# Patient Record
Sex: FEMALE | Race: WHITE | NOT HISPANIC OR LATINO | Employment: OTHER | URBAN - METROPOLITAN AREA
[De-identification: names, ages, dates, MRNs, and addresses within clinical notes are randomized per-mention and may not be internally consistent; named-entity substitution may affect disease eponyms.]

---

## 2017-01-24 ENCOUNTER — NURSE TRIAGE (OUTPATIENT)
Dept: ADMINISTRATIVE | Facility: CLINIC | Age: 57
End: 2017-01-24

## 2017-01-24 ENCOUNTER — HOSPITAL ENCOUNTER (EMERGENCY)
Facility: HOSPITAL | Age: 57
Discharge: HOME | End: 2017-01-24
Attending: EMERGENCY MEDICINE
Payer: COMMERCIAL

## 2017-01-24 VITALS
OXYGEN SATURATION: 98 % | RESPIRATION RATE: 18 BRPM | HEART RATE: 79 BPM | DIASTOLIC BLOOD PRESSURE: 61 MMHG | SYSTOLIC BLOOD PRESSURE: 116 MMHG | HEIGHT: 64 IN | TEMPERATURE: 99 F | WEIGHT: 203 LBS | BODY MASS INDEX: 34.66 KG/M2

## 2017-01-24 DIAGNOSIS — J02.9 SORE THROAT: ICD-10-CM

## 2017-01-24 DIAGNOSIS — J03.90 ACUTE TONSILLITIS, UNSPECIFIED ETIOLOGY: Primary | ICD-10-CM

## 2017-01-24 LAB
ALBUMIN SERPL BCP-MCNC: 4.1 G/DL
ALP SERPL-CCNC: 91 U/L
ALT SERPL W/O P-5'-P-CCNC: 19 U/L
ANION GAP SERPL CALC-SCNC: 11 MMOL/L
AST SERPL-CCNC: 17 U/L
BASOPHILS # BLD AUTO: 0.02 K/UL
BASOPHILS NFR BLD: 0.2 %
BILIRUB SERPL-MCNC: 1.3 MG/DL
BUN SERPL-MCNC: 8 MG/DL
CALCIUM SERPL-MCNC: 9.5 MG/DL
CHLORIDE SERPL-SCNC: 102 MMOL/L
CO2 SERPL-SCNC: 26 MMOL/L
CREAT SERPL-MCNC: 0.9 MG/DL
DIFFERENTIAL METHOD: ABNORMAL
EOSINOPHIL # BLD AUTO: 0.1 K/UL
EOSINOPHIL NFR BLD: 0.4 %
ERYTHROCYTE [DISTWIDTH] IN BLOOD BY AUTOMATED COUNT: 13.4 %
EST. GFR  (AFRICAN AMERICAN): >60 ML/MIN/1.73 M^2
EST. GFR  (NON AFRICAN AMERICAN): >60 ML/MIN/1.73 M^2
GLUCOSE SERPL-MCNC: 127 MG/DL
HCT VFR BLD AUTO: 41.3 %
HGB BLD-MCNC: 14 G/DL
INR PPP: 1
LYMPHOCYTES # BLD AUTO: 1.6 K/UL
LYMPHOCYTES NFR BLD: 14.2 %
MCH RBC QN AUTO: 29.7 PG
MCHC RBC AUTO-ENTMCNC: 33.9 %
MCV RBC AUTO: 88 FL
MONOCYTES # BLD AUTO: 0.9 K/UL
MONOCYTES NFR BLD: 7.8 %
NEUTROPHILS # BLD AUTO: 8.8 K/UL
NEUTROPHILS NFR BLD: 77.4 %
PLATELET # BLD AUTO: 250 K/UL
PMV BLD AUTO: 9.6 FL
POTASSIUM SERPL-SCNC: 4.2 MMOL/L
PROT SERPL-MCNC: 8.2 G/DL
PROTHROMBIN TIME: 10.7 SEC
RBC # BLD AUTO: 4.72 M/UL
SODIUM SERPL-SCNC: 139 MMOL/L
WBC # BLD AUTO: 11.3 K/UL

## 2017-01-24 PROCEDURE — 25000003 PHARM REV CODE 250: Performed by: EMERGENCY MEDICINE

## 2017-01-24 PROCEDURE — 96375 TX/PRO/DX INJ NEW DRUG ADDON: CPT

## 2017-01-24 PROCEDURE — 63600175 PHARM REV CODE 636 W HCPCS: Performed by: EMERGENCY MEDICINE

## 2017-01-24 PROCEDURE — 99284 EMERGENCY DEPT VISIT MOD MDM: CPT | Mod: 25

## 2017-01-24 PROCEDURE — 25500020 PHARM REV CODE 255: Performed by: EMERGENCY MEDICINE

## 2017-01-24 PROCEDURE — 85025 COMPLETE CBC W/AUTO DIFF WBC: CPT

## 2017-01-24 PROCEDURE — 80053 COMPREHEN METABOLIC PANEL: CPT

## 2017-01-24 PROCEDURE — 85610 PROTHROMBIN TIME: CPT

## 2017-01-24 PROCEDURE — 96365 THER/PROPH/DIAG IV INF INIT: CPT

## 2017-01-24 RX ORDER — MORPHINE SULFATE 4 MG/ML
4 INJECTION, SOLUTION INTRAMUSCULAR; INTRAVENOUS
Status: COMPLETED | OUTPATIENT
Start: 2017-01-24 | End: 2017-01-24

## 2017-01-24 RX ORDER — CLINDAMYCIN HYDROCHLORIDE 150 MG/1
300 CAPSULE ORAL 4 TIMES DAILY
Qty: 56 CAPSULE | Refills: 0 | Status: SHIPPED | OUTPATIENT
Start: 2017-01-24 | End: 2017-01-31

## 2017-01-24 RX ORDER — ONDANSETRON 2 MG/ML
4 INJECTION INTRAMUSCULAR; INTRAVENOUS
Status: COMPLETED | OUTPATIENT
Start: 2017-01-24 | End: 2017-01-24

## 2017-01-24 RX ORDER — NAPROXEN 500 MG/1
500 TABLET ORAL 2 TIMES DAILY WITH MEALS
Qty: 30 TABLET | Refills: 0 | Status: SHIPPED | OUTPATIENT
Start: 2017-01-24 | End: 2019-06-04

## 2017-01-24 RX ORDER — TRAMADOL HYDROCHLORIDE 50 MG/1
50 TABLET ORAL EVERY 6 HOURS PRN
Qty: 11 TABLET | Refills: 0 | Status: SHIPPED | OUTPATIENT
Start: 2017-01-24 | End: 2017-02-03

## 2017-01-24 RX ORDER — CEFDINIR 300 MG/1
300 CAPSULE ORAL 2 TIMES DAILY
COMMUNITY
End: 2019-06-04

## 2017-01-24 RX ORDER — CLINDAMYCIN PHOSPHATE 900 MG/50ML
900 INJECTION, SOLUTION INTRAVENOUS
Status: COMPLETED | OUTPATIENT
Start: 2017-01-24 | End: 2017-01-24

## 2017-01-24 RX ORDER — DEXAMETHASONE SODIUM PHOSPHATE 4 MG/ML
8 INJECTION, SOLUTION INTRA-ARTICULAR; INTRALESIONAL; INTRAMUSCULAR; INTRAVENOUS; SOFT TISSUE
Status: COMPLETED | OUTPATIENT
Start: 2017-01-24 | End: 2017-01-24

## 2017-01-24 RX ADMIN — DEXAMETHASONE SODIUM PHOSPHATE 8 MG: 4 INJECTION, SOLUTION INTRAMUSCULAR; INTRAVENOUS at 09:01

## 2017-01-24 RX ADMIN — CLINDAMYCIN IN 5 PERCENT DEXTROSE 900 MG: 18 INJECTION, SOLUTION INTRAVENOUS at 09:01

## 2017-01-24 RX ADMIN — ONDANSETRON 4 MG: 2 INJECTION INTRAMUSCULAR; INTRAVENOUS at 09:01

## 2017-01-24 RX ADMIN — MORPHINE SULFATE 2 MG: 4 INJECTION, SOLUTION INTRAMUSCULAR; INTRAVENOUS at 09:01

## 2017-01-24 RX ADMIN — IOHEXOL 75 ML: 350 INJECTION, SOLUTION INTRAVENOUS at 11:01

## 2017-01-24 RX ADMIN — SODIUM CHLORIDE 500 ML: 0.9 INJECTION, SOLUTION INTRAVENOUS at 09:01

## 2017-01-24 NOTE — ED TRIAGE NOTES
Pt c/o R-sided throat pain x5 days, painful to palpation.  Pt reports low grade fever at home (Tmax 99.0).

## 2017-01-24 NOTE — DISCHARGE INSTRUCTIONS
When You Have a Sore Throat  A sore throat can be painful. There are many reasons why you may have a sore throat. Your healthcare provider will work with you to find the cause of your sore throat. He or she will also find the best treatment for you.      What Causes a Sore Throat?  Sore throats can be caused or worsened by:  · Cold or flu viruses  · Bacteria  · Irritants such as tobacco smoke  · Acid reflux  A Healthy Throat  The tonsils are on the sides of the throat near the base of the tongue. They collect viruses and bacteria and help fight infection. The throat (pharynx) is the passage for air. Mucus from the nasal cavity also moves down the passage.  An Inflamed Throat  The tonsils and pharynx can become inflamed due to a cold or flu virus. Postnasal drip (excess mucus draining from the nasal cavity) can irritate the throat. It can also make the throat or tonsils more likely to be infected by bacteria. Severe, untreated tonsillitis in children or adults can cause a pocket of pus (abscess) to form near the tonsil.  Your Evaluation  A medical evaluation can help find the cause of your sore throat. It can also help your healthcare provider choose the best treatment for you. The evaluation may include a health history, physical exam, and diagnostic tests.  Health History  Your healthcare provider may ask you the following:  · How long has the sore throat lasted and how have you been treating it?  · Do you have any other symptoms, such as body aches, fever, or cough?  · Does your sore throat recur? If so, how often? How many days of school or work have you missed because of a sore throat?  · Do you have trouble eating or swallowing?  · Have you been told that you snore or have other sleep problems?  · Do you have bad breath?  · Do you cough up bad-tasting mucus?  Physical Exam  During the exam, your healthcare provider checks your ears, nose, and throat for problems. He or she also checks for swelling in the  neck, and may listen to your chest.  Possible Tests  Other tests your healthcare provider may perform include:  · A throat swab to check for bacteria such as streptococcus (the bacteria that causes strep throat)  · A blood test to check for mononucleosis (a viral infection)  · A chest x-ray to rule out pneumonia, especially if you have a cough  Treating a Sore Throat  Treatment depends on many factors. What is the likely cause? Is the problem recent? Does it keep coming back? In many cases, the best thing to do is to treat the symptoms, rest, and let the problem heal itself. Antibiotics may help clear up some infections. For cases of severe or recurring tonsillitis, the tonsils may need to be removed.  Relieving Your Symptoms  · Dont smoke, and avoid secondhand smoke.  · For children, try throat sprays or Popsicles. Adults and older children may try lozenges.  · Drink warm liquids to soothe the throat and help thin mucus. Avoid alcohol, spicy foods, and acidic drinks such as orange juice. These can irritate the throat.  · Gargle with warm saltwater (1 teaspoon of salt to 8 ounces of warm water).  · Use a humidifier to keep air moist and relieve throat dryness.  · Try over-the-counter pain relievers such as acetaminophen or ibuprofen. Use as directed, and dont exceed the recommended dose. Dont give aspirin to children.   Are Antibiotics Needed?  If your sore throat is due to a bacterial infection, antibiotics may speed healing and prevent complications. But most sore throats are caused by cold or flu viruses. And antibiotics dont treat viral illness. In fact, using antibiotics when theyre not needed may produce bacteria that are harder to kill. Your healthcare provider will prescribe antibiotics only if he or she thinks they are likely to help.  If Antibiotics Are Prescribed  Take the medication exactly as directed. Be sure to finish your prescription even if youre feeling better.  And be sure to ask your  healthcare provider or pharmacist what side effects are common and what to do about them.  Is Surgery Needed?  In some cases, tonsils need to be removed. This is often done as outpatient (same-day) surgery. Your healthcare provider may advise removing the tonsils in cases of:  · Several severe bouts of tonsillitis in a year. Severe episodes include those that lead to missed days of school or work, or that need to be treated with antibiotics.  · Tonsillitis that causes breathing problems during sleep.  · Tonsillitis caused by food particles collecting in pouches in the tonsils (cryptic tonsillitis).  Call your healthcare provider if any of the following occur:  · Symptoms worsen, or new symptoms develop.  · Swollen tonsils make breathing difficult.  · The pain is severe enough to keep you from drinking liquids.  · A skin rash, hives, or wheezing develops. Any of these could signal an allergic reaction to antibiotics.  · Symptoms dont improve within a week.  · Symptoms dont improve within 2-3 days of starting antibiotics.   © 4856-4935 [employer] [address] [place] [address], [place] [address]. All rights reserved. This information is not intended as a substitute for professional medical care. Always follow your healthcare professional's instructions.

## 2017-01-24 NOTE — ED AVS SNAPSHOT
[facility]  [id number] [place] [id number]               Lenora Santiago   2017  8:04 AM   ED    Description:  Female : 1960   Department:  [facility]            Your Care was Coordinated By:     Provider Role From To    Andrew Guzmán MD Attending Provider 17 0754 --      Reason for Visit     Sore Throat           Diagnoses this Visit        Comments    Acute tonsillitis, unspecified etiology    -  Primary     Sore throat           ED Disposition     None           To Do List           Follow-up Information     Follow up with Your PCP In 3 days.        Follow up with [facility].    Specialty:  Emergency Medicine    Why:  If symptoms worsen, Or worsening condition or any other major concern    Contact information:    [address] [place] [id number] [phone]       These Medications        Disp Refills Start End    clindamycin (CLEOCIN) 150 MG capsule 56 capsule 0 2017    Take 2 capsules (300 mg total) by mouth 4 (four) times daily. - Oral    naproxen (NAPROSYN) 500 MG tablet 30 tablet 0 2017     Take 1 tablet (500 mg total) by mouth 2 (two) times daily with meals. - Oral    tramadol (ULTRAM) 50 mg tablet 11 tablet 0 2017 2/3/2017    Take 1 tablet (50 mg total) by mouth every 6 (six) hours as needed. - Oral      [facility]     [facility] On Call Nurse Care Line -  Assistance  Registered nurses in the [facility] provide clinical advisement, health education, appointment booking, and other advisory services.  Call for this free service at [phone].             Medications           Message regarding Medications     Verify the changes and/or additions to your medication regime listed below are the same as discussed with your clinician today.  If any of these changes or additions are incorrect, please notify your healthcare  provider.        START taking these NEW medications        Refills    clindamycin (CLEOCIN) 150 MG capsule 0    Sig: Take 2 capsules (300 mg total) by mouth 4 (four) times daily.    Class: Print    Route: Oral    naproxen (NAPROSYN) 500 MG tablet 0    Sig: Take 1 tablet (500 mg total) by mouth 2 (two) times daily with meals.    Class: Print    Route: Oral    tramadol (ULTRAM) 50 mg tablet 0    Sig: Take 1 tablet (50 mg total) by mouth every 6 (six) hours as needed.    Class: Print    Route: Oral      These medications were administered today        Dose Freq    sodium chloride 0.9% bolus 500 mL 500 mL ED 1 Time    Sig: Inject 500 mLs into the vein ED 1 Time.    Class: Normal    Route: Intravenous    clindamycin 900 MG/50 ML D5W 900 mg/50 mL IVPB 900 mg 900 mg ED 1 Time    Sig: Inject 50 mLs (900 mg total) into the vein ED 1 Time.    Class: Normal    Route: Intravenous    dexamethasone injection 8 mg 8 mg ED 1 Time    Sig: Inject 2 mLs (8 mg total) into the vein ED 1 Time.    Class: Normal    Route: Intravenous    morphine injection 4 mg 4 mg ED 1 Time    Sig: Inject 1 mL (4 mg total) into the vein ED 1 Time.    Class: Normal    Route: Intravenous    ondansetron injection 4 mg 4 mg ED 1 Time    Sig: Inject 4 mg into the vein ED 1 Time.    Class: Normal    Route: Intravenous    omnipaque 350 iohexol 75 mL 75 mL IMG once as needed    Sig: Inject 75 mLs into the vein ONCE PRN for contrast.    Class: Normal    Route: Intravenous           Verify that the below list of medications is an accurate representation of the medications you are currently taking.  If none reported, the list may be blank. If incorrect, please contact your healthcare provider. Carry this list with you in case of emergency.           Current Medications     cefdinir (OMNICEF) 300 MG capsule Take 300 mg by mouth 2 (two) times daily.    clindamycin (CLEOCIN) 150 MG capsule Take 2 capsules (300 mg total) by mouth 4 (four) times daily.    naproxen  "(NAPROSYN) 500 MG tablet Take 1 tablet (500 mg total) by mouth 2 (two) times daily with meals.    tramadol (ULTRAM) 50 mg tablet Take 1 tablet (50 mg total) by mouth every 6 (six) hours as needed.           Clinical Reference Information           Your Vitals Were     BP Pulse Temp Resp Height Weight    103/59 (BP Location: Left arm, Patient Position: Lying, BP Method: Automatic) 74 98.7 °F (37.1 °C) (Oral) 18 5' 4" (1.626 m) 92.1 kg (203 lb)    SpO2 BMI             95% 34.84 kg/m2         Allergies as of 1/24/2017     No Known Allergies      Immunizations Administered on Date of Encounter - 1/24/2017     None      ED Micro, Lab, POCT     Start Ordered       Status Ordering Provider    01/24/17 0831 01/24/17 0835  CBC auto differential  STAT      Final result     01/24/17 0831 01/24/17 0835  Comprehensive metabolic panel  STAT      Final result     01/24/17 0831 01/24/17 0835  Protime-INR  STAT      Final result       ED Imaging Orders     Start Ordered       Status Ordering Provider    01/24/17 0835 01/24/17 0835  CT Soft Tissue Neck With Contrast  1 time imaging      Final result         Discharge Instructions         When You Have a Sore Throat  A sore throat can be painful. There are many reasons why you may have a sore throat. Your healthcare provider will work with you to find the cause of your sore throat. He or she will also find the best treatment for you.      What Causes a Sore Throat?  Sore throats can be caused or worsened by:  · Cold or flu viruses  · Bacteria  · Irritants such as tobacco smoke  · Acid reflux  A Healthy Throat  The tonsils are on the sides of the throat near the base of the tongue. They collect viruses and bacteria and help fight infection. The throat (pharynx) is the passage for air. Mucus from the nasal cavity also moves down the passage.  An Inflamed Throat  The tonsils and pharynx can become inflamed due to a cold or flu virus. Postnasal drip (excess mucus draining from the nasal " cavity) can irritate the throat. It can also make the throat or tonsils more likely to be infected by bacteria. Severe, untreated tonsillitis in children or adults can cause a pocket of pus (abscess) to form near the tonsil.  Your Evaluation  A medical evaluation can help find the cause of your sore throat. It can also help your healthcare provider choose the best treatment for you. The evaluation may include a health history, physical exam, and diagnostic tests.  Health History  Your healthcare provider may ask you the following:  · How long has the sore throat lasted and how have you been treating it?  · Do you have any other symptoms, such as body aches, fever, or cough?  · Does your sore throat recur? If so, how often? How many days of school or work have you missed because of a sore throat?  · Do you have trouble eating or swallowing?  · Have you been told that you snore or have other sleep problems?  · Do you have bad breath?  · Do you cough up bad-tasting mucus?  Physical Exam  During the exam, your healthcare provider checks your ears, nose, and throat for problems. He or she also checks for swelling in the neck, and may listen to your chest.  Possible Tests  Other tests your healthcare provider may perform include:  · A throat swab to check for bacteria such as streptococcus (the bacteria that causes strep throat)  · A blood test to check for mononucleosis (a viral infection)  · A chest x-ray to rule out pneumonia, especially if you have a cough  Treating a Sore Throat  Treatment depends on many factors. What is the likely cause? Is the problem recent? Does it keep coming back? In many cases, the best thing to do is to treat the symptoms, rest, and let the problem heal itself. Antibiotics may help clear up some infections. For cases of severe or recurring tonsillitis, the tonsils may need to be removed.  Relieving Your Symptoms  · Dont smoke, and avoid secondhand smoke.  · For children, try throat sprays  or Popsicles. Adults and older children may try lozenges.  · Drink warm liquids to soothe the throat and help thin mucus. Avoid alcohol, spicy foods, and acidic drinks such as orange juice. These can irritate the throat.  · Gargle with warm saltwater (1 teaspoon of salt to 8 ounces of warm water).  · Use a humidifier to keep air moist and relieve throat dryness.  · Try over-the-counter pain relievers such as acetaminophen or ibuprofen. Use as directed, and dont exceed the recommended dose. Dont give aspirin to children.   Are Antibiotics Needed?  If your sore throat is due to a bacterial infection, antibiotics may speed healing and prevent complications. But most sore throats are caused by cold or flu viruses. And antibiotics dont treat viral illness. In fact, using antibiotics when theyre not needed may produce bacteria that are harder to kill. Your healthcare provider will prescribe antibiotics only if he or she thinks they are likely to help.  If Antibiotics Are Prescribed  Take the medication exactly as directed. Be sure to finish your prescription even if youre feeling better.  And be sure to ask your healthcare provider or pharmacist what side effects are common and what to do about them.  Is Surgery Needed?  In some cases, tonsils need to be removed. This is often done as outpatient (same-day) surgery. Your healthcare provider may advise removing the tonsils in cases of:  · Several severe bouts of tonsillitis in a year. Severe episodes include those that lead to missed days of school or work, or that need to be treated with antibiotics.  · Tonsillitis that causes breathing problems during sleep.  · Tonsillitis caused by food particles collecting in pouches in the tonsils (cryptic tonsillitis).  Call your healthcare provider if any of the following occur:  · Symptoms worsen, or new symptoms develop.  · Swollen tonsils make breathing difficult.  · The pain is severe enough to keep you from drinking  liquids.  · A skin rash, hives, or wheezing develops. Any of these could signal an allergic reaction to antibiotics.  · Symptoms dont improve within a week.  · Symptoms dont improve within 2-3 days of starting antibiotics.   © 1647-5962 [employer]. [address] [place] [address], [place] [address]. All rights reserved. This information is not intended as a substitute for professional medical care. Always follow your healthcare professional's instructions.          Discharge References/Attachments     PHARYNGITIS, STREP (PRESUMED) (ENGLISH)      [facility] Sign-Up     Activating your [facility] account is as easy as 1-2-3!     1) Visit [email or web address], select Sign Up Now, enter this activation code and your date of birth, then select Next.  [email or web address]  Expires: 3/10/2017 11:27 AM      2) Create a username and password to use when you visit [facility] in the future and select a security question in case you lose your password and select Next.    3) Enter your e-mail address and click Sign Up!    Additional Information  If you have questions, please e-mail [email or web address] or call [phone] to talk to our [facility] staff. Remember, [facility] is NOT to be used for urgent needs. For medical emergencies, dial 911.          [facility] complies with applicable Federal civil rights laws and does not discriminate on the basis of race, color, national origin, age, disability, or sex.        Language Assistance Services     ATTENTION: Language assistance services are available, free of charge. Please call [phone].      ATENCIÓN: Si habla español, tiene a niño disposición servicios gratuitos de asistencia lingüística. Llame al [phone].     CHÚ Ý: N?u b?n nói Ti?ng Vi?t, có các d?ch v? h? tr? ngôn ng? mi?n phí dành cho b?n. G?i s? [phone].

## 2017-01-24 NOTE — ED PROVIDER NOTES
SCRIBE #1 NOTE: I, Baltazar Peacock, am scribing for, and in the presence of, Andrew Guzmán MD. I have scribed the entire note.      History      Chief Complaint   Patient presents with    Sore Throat       Review of patient's allergies indicates:  No Known Allergies     HPI   HPI    1/24/2017, 8:24 AM   History obtained from the patient      History of Present Illness: Lenora Santiago is a 56 y.o. female patient who presents to the Emergency Department for sore throat which onset gradually 4 days ago worsening since. Symptoms are constant and moderate in severity. Sx are exacerbated when swallowing and relieved by nothing. Associated sxs include mild R neck swelling, difficulty swallowing, and low grade fever. Pt reports being swabbed for strep yesterday via PCP which was negative. Patient denies any N/V/D, chills, ear pain, cough, congestion, rhinorrhea and all other sxs at this time. No further complaints or concerns at this time.     Arrival mode: Personal vehicle     PCP: Primary Doctor No       Past Medical History:  History reviewed. No pertinent past medical history.    Past Surgical History:  Past Surgical History   Procedure Laterality Date    Appendectomy           Family History:  History reviewed. No pertinent family history.    Social History:  Social History     Social History Main Topics    Smoking status: Never Smoker    Smokeless tobacco: Unknown    Alcohol use No    Drug use: No    Sexual activity: No       ROS   Review of Systems   Constitutional: Negative for chills and fever.   HENT: Positive for sore throat and trouble swallowing. Negative for congestion, ear pain, rhinorrhea and sinus pressure.         (+)R neck swelling   Respiratory: Negative for chest tightness and shortness of breath.    Cardiovascular: Negative for chest pain.   Gastrointestinal: Negative for abdominal pain, nausea and vomiting.   Musculoskeletal: Negative for back pain and neck pain.   Skin: Negative for  "rash.   Neurological: Negative for dizziness, numbness and headaches.   Psychiatric/Behavioral: Negative for agitation and confusion.   All other systems reviewed and are negative.      Physical Exam    Initial Vitals   BP Pulse Resp Temp SpO2   01/24/17 0752 01/24/17 0752 01/24/17 0752 01/24/17 0752 01/24/17 0752   140/71 98 20 98.7 °F (37.1 °C) 98 %      Physical Exam  Nursing Notes and Vital Signs Reviewed.  Constitutional: Patient is in no acute distress. Awake and alert. Well-developed and well-nourished.  Head: Atraumatic. Normocephalic.  Eyes: PERRL. EOM intact. Conjunctivae are not pale. No scleral icterus.  ENT: Mucous membranes are moist. Oropharynx is erythematous, no exudates appreciated. Swelling noted below the angle of the mandible. Fullness noted to the soft palate on the R with uvula deviation to the L.      Neck: Supple. Full ROM. No JVD. Assymetry noted to the R side of neck compared to L.   Cardiovascular: Regular rate. Regular rhythm. No murmurs, rubs, or gallops. Distal pulses are 2+ and symmetric.  Pulmonary/Chest: No respiratory distress. Clear to auscultation bilaterally. No wheezing, rales, or rhonchi.  Abdominal: Soft and non-distended.  There is no tenderness.  No rebound, guarding, or rigidity.    Musculoskeletal: Moves all extremities. No obvious deformities. No edema.   Skin: Warm and dry.  Neurological:  Alert, awake, and appropriate.  Normal speech.  No acute focal neurological deficits are appreciated.  Psychiatric: Normal affect. Good eye contact. Appropriate in content.    ED Course    Procedures  ED Vital Signs:  Vitals:    01/24/17 0752 01/24/17 1021 01/24/17 1143   BP: (!) 140/71 (!) 103/59 116/61   Pulse: 98 74 79   Resp: 20 18 18   Temp: 98.7 °F (37.1 °C)     TempSrc: Oral     SpO2: 98% 95% 98%   Weight: 92.1 kg (203 lb)     Height: 5' 4" (1.626 m)         Abnormal Lab Results:  Labs Reviewed   CBC W/ AUTO DIFFERENTIAL - Abnormal; Notable for the following:        Result " Value    Gran # 8.8 (*)     Gran% 77.4 (*)     Lymph% 14.2 (*)     All other components within normal limits   COMPREHENSIVE METABOLIC PANEL - Abnormal; Notable for the following:     Glucose 127 (*)     Total Bilirubin 1.3 (*)     All other components within normal limits   PROTIME-INR        All Lab Results:  Results for orders placed or performed during the hospital encounter of 01/24/17   CBC auto differential   Result Value Ref Range    WBC 11.30 3.90 - 12.70 K/uL    RBC 4.72 4.00 - 5.40 M/uL    Hemoglobin 14.0 12.0 - 16.0 g/dL    Hematocrit 41.3 37.0 - 48.5 %    MCV 88 82 - 98 fL    MCH 29.7 27.0 - 31.0 pg    MCHC 33.9 32.0 - 36.0 %    RDW 13.4 11.5 - 14.5 %    Platelets 250 150 - 350 K/uL    MPV 9.6 9.2 - 12.9 fL    Gran # 8.8 (H) 1.8 - 7.7 K/uL    Lymph # 1.6 1.0 - 4.8 K/uL    Mono # 0.9 0.3 - 1.0 K/uL    Eos # 0.1 0.0 - 0.5 K/uL    Baso # 0.02 0.00 - 0.20 K/uL    Gran% 77.4 (H) 38.0 - 73.0 %    Lymph% 14.2 (L) 18.0 - 48.0 %    Mono% 7.8 4.0 - 15.0 %    Eosinophil% 0.4 0.0 - 8.0 %    Basophil% 0.2 0.0 - 1.9 %    Differential Method Automated    Comprehensive metabolic panel   Result Value Ref Range    Sodium 139 136 - 145 mmol/L    Potassium 4.2 3.5 - 5.1 mmol/L    Chloride 102 95 - 110 mmol/L    CO2 26 23 - 29 mmol/L    Glucose 127 (H) 70 - 110 mg/dL    BUN, Bld 8 6 - 20 mg/dL    Creatinine 0.9 0.5 - 1.4 mg/dL    Calcium 9.5 8.7 - 10.5 mg/dL    Total Protein 8.2 6.0 - 8.4 g/dL    Albumin 4.1 3.5 - 5.2 g/dL    Total Bilirubin 1.3 (H) 0.1 - 1.0 mg/dL    Alkaline Phosphatase 91 55 - 135 U/L    AST 17 10 - 40 U/L    ALT 19 10 - 44 U/L    Anion Gap 11 8 - 16 mmol/L    eGFR if African American >60 >60 mL/min/1.73 m^2    eGFR if non African American >60 >60 mL/min/1.73 m^2   Protime-INR   Result Value Ref Range    Prothrombin Time 10.7 9.0 - 12.5 sec    INR 1.0 0.8 - 1.2         Imaging Results:  Imaging Results         CT Soft Tissue Neck With Contrast (Final result) Result time:  01/24/17 11:17:57    Final  result by Corrie Mchugh MD (01/24/17 11:17:57)    Impression:         Right lateral pharyngeal wall soft tissue thickening with right tonsillar enlargement compatible with pharyngitis/tonsillitis.  No definite abscess.  Please correlate with clinical exam.            All CT scans at this facility use dose modulation, iterative reconstruction and/or weight based dosing when appropriate to reduce radiation dose to as low as reasonably achievable.       Electronically signed by: CORRIE MCHUGH MD  Date:     01/24/17  Time:    11:17     Narrative:    CT SOFT TISSUE NECK WITH CONTRAST    Date: 01/24/17 10:44:13    Clinical indication:  right throat pain and swelling     Technique:  Contrast-enhanced CT neck soft tissues performed with 75 cc Omnipaque 350. No previous for comparison.     Findings:  There is thickening of the right lateral oropharyngeal and hypopharyngeal wall.  The right tonsil is enlarged without evidence of obvious abscess.  There is mild thickening of the epiglottis.  The prevertebral soft tissues appear normal.    No obvious lymphadenopathy is seen.  The thyroid gland appears normal.  Salivary gland region appears normal.  Carotid bifurcation is unremarkable.  Mild to moderate cervical degenerative disc disease.     No additional findings.                      The Emergency Provider reviewed the vital signs and test results, which are outlined above.    ED Discussion     11:31 AM: Reassessed pt at this time. Discussed with pt all pertinent ED information and results. Pt states her pain has improved at this time and she can swallow better at this time. Discussed pt dx and plan of tx. Gave pt all f/u and return to the ED instructions. All questions and concerns were addressed at this time. Pt expresses understanding of information and instructions, and is comfortable with plan to discharge. Pt is stable for discharge.    Pre-hypertension/Hypertension: The pt has been informed that they may have  pre-hypertension or hypertension based on a blood pressure reading in the ED. I recommend that the pt call the PCP listed on their discharge instructions or a physician of their choice this week to arrange f/u for further evaluation of possible pre-hypertension or hypertension.       ED Medication(s):  Medications   sodium chloride 0.9% bolus 500 mL (0 mLs Intravenous Stopped 1/24/17 1057)   clindamycin 900 MG/50 ML D5W 900 mg/50 mL IVPB 900 mg (0 mg Intravenous Stopped 1/24/17 1057)   dexamethasone injection 8 mg (8 mg Intravenous Given 1/24/17 0927)   morphine injection 4 mg (2 mg Intravenous Given 1/24/17 0928)   ondansetron injection 4 mg (4 mg Intravenous Given 1/24/17 0928)   omnipaque 350 iohexol 75 mL (75 mLs Intravenous Given 1/24/17 1108)       Discharge Medication List as of 1/24/2017 11:27 AM      START taking these medications    Details   clindamycin (CLEOCIN) 150 MG capsule Take 2 capsules (300 mg total) by mouth 4 (four) times daily., Starting 1/24/2017, Until Tue 1/31/17, Print      naproxen (NAPROSYN) 500 MG tablet Take 1 tablet (500 mg total) by mouth 2 (two) times daily with meals., Starting 1/24/2017, Until Discontinued, Print      tramadol (ULTRAM) 50 mg tablet Take 1 tablet (50 mg total) by mouth every 6 (six) hours as needed., Starting 1/24/2017, Until Fri 2/3/17, Print             Follow-up Information     Follow up with Your PCP In 3 days.        Follow up with [facility].    Specialty:  Emergency Medicine    Why:  If symptoms worsen, Or worsening condition or any other major concern    Contact information:    [address]  [facility] [address] [phone]            Medical Decision Making    Medical Decision Making:   Clinical Tests:   Lab Tests: Reviewed and Ordered  Radiological Study: Reviewed and Ordered           Scribe Attestation:   Scribe #1: I performed the above scribed service and the documentation accurately describes the services I  performed. I attest to the accuracy of the note.    Attending:   Physician Attestation Statement for Scribe #1: I, Andrew Guzmán MD, personally performed the services described in this documentation, as scribed by Baltazar Peacock, in my presence, and it is both accurate and complete.          Clinical Impression       ICD-10-CM ICD-9-CM   1. Acute tonsillitis, unspecified etiology J03.90 463   2. Sore throat J02.9 462       Disposition:   Disposition: Discharged  Condition: Stable         Andrew Guzmán MD  01/24/17 5844

## 2017-01-24 NOTE — ED TRIAGE NOTES
Complains of sore and swollen throat this am with difficulty and pain with swallowing. Also reports blood tinged sputum this am. States was seen by PCP yesterday and given Omnicef but reports symptoms are much worse this am.

## 2017-01-25 ENCOUNTER — HOSPITAL ENCOUNTER (EMERGENCY)
Facility: HOSPITAL | Age: 57
Discharge: HOME | End: 2017-01-25
Attending: EMERGENCY MEDICINE
Payer: COMMERCIAL

## 2017-01-25 VITALS
BODY MASS INDEX: 35.34 KG/M2 | SYSTOLIC BLOOD PRESSURE: 139 MMHG | HEART RATE: 84 BPM | WEIGHT: 207 LBS | RESPIRATION RATE: 18 BRPM | HEIGHT: 64 IN | TEMPERATURE: 98 F | DIASTOLIC BLOOD PRESSURE: 66 MMHG | OXYGEN SATURATION: 98 %

## 2017-01-25 DIAGNOSIS — T78.40XA ALLERGIC REACTION CAUSED BY A DRUG, INITIAL ENCOUNTER: Primary | ICD-10-CM

## 2017-01-25 PROCEDURE — 63600175 PHARM REV CODE 636 W HCPCS: Performed by: NURSE PRACTITIONER

## 2017-01-25 PROCEDURE — 99283 EMERGENCY DEPT VISIT LOW MDM: CPT

## 2017-01-25 RX ORDER — PREDNISONE 20 MG/1
60 TABLET ORAL
Status: COMPLETED | OUTPATIENT
Start: 2017-01-25 | End: 2017-01-25

## 2017-01-25 RX ORDER — PREDNISONE 50 MG/1
50 TABLET ORAL DAILY
Qty: 3 TABLET | Refills: 0 | Status: SHIPPED | OUTPATIENT
Start: 2017-01-25 | End: 2017-01-28

## 2017-01-25 RX ADMIN — PREDNISONE 60 MG: 20 TABLET ORAL at 01:01

## 2017-01-25 NOTE — ED PROVIDER NOTES
"SCRIBE #1 NOTE: I, Taco Melendez, am scribing for, and in the presence of, Bentley Escobar NP. I have scribed the entire note.      History      Chief Complaint   Patient presents with    Allergic Reaction     pt. reports taking clindamycin and now face is red and burning        Review of patient's allergies indicates:  No Known Allergies     HPI   HPI    1/25/2017, 1:12 PM   History obtained from the patient      History of Present Illness: Lenora Santiago is a 56 y.o. female patient who presents to the Emergency Department for rash to the face which onset gradually 5 hours PTA. Symptoms are constant and moderate in severity. Pt reports the rash started 20 minutes after taking a dose of Clindamycin. Pt is taking for Clindamycin for a dx of tonsillitis yesterday. Pt states "it feels like my face is on fire". No mitigating or exacerbating factors reported. No associated sx reported. Patient denies any fever, sore throat, trouble swallowing, chills, n/v/d, facial swelling, tongue swelling, itching, and all other sxs at this time. No further complaints or concerns at this time.         Arrival mode: Personal vehicle    PCP: Primary Doctor No       Past Medical History:  History reviewed. No pertinent past medical history.    Past Surgical History:  Past Surgical History   Procedure Laterality Date    Appendectomy           Family History:  History reviewed. No pertinent family history.    Social History:  Social History     Social History Main Topics    Smoking status: Never Smoker    Smokeless tobacco: Unknown    Alcohol use No    Drug use: No    Sexual activity: No       ROS   Review of Systems   Constitutional: Negative for chills and fever.   HENT: Negative for facial swelling, sore throat and trouble swallowing.         (-) tongue swelling   Respiratory: Negative for shortness of breath.    Cardiovascular: Negative for chest pain.   Gastrointestinal: Negative for diarrhea, nausea and vomiting. "   Genitourinary: Negative for dysuria.   Musculoskeletal: Negative for back pain.   Skin: Positive for rash (to face).        (-) itching   Neurological: Negative for weakness.   Hematological: Does not bruise/bleed easily.       Physical Exam    Initial Vitals   BP Pulse Resp Temp SpO2   01/25/17 1253 01/25/17 1253 01/25/17 1253 01/25/17 1253 01/25/17 1253   139/66 84 18 98.1 °F (36.7 °C) 98 %      Physical Exam  Nursing Notes and Vital Signs Reviewed.  Constitutional: Patient is in no acute distress. Awake and alert. Well-developed and well-nourished.  Head: Atraumatic. Normocephalic.  Eyes: PERRL. EOM intact. Conjunctivae are not pale. No scleral icterus.  Ears: Right TM normal. Left TM normal. No erythema. No bulging. No effusion or air-fluid levels. No perforation.   Nose: Patent nares. Turbinates are normal. No drainage.   Throat: Moist mucous membranes. Posterior oropharynx is symmetric without erythema. Tonsillar exudate is not present. No trismus. Normal handling of secretions. No stridor. No tongue swelling. Tonsils are normal.  Neck: Supple. Full ROM. No lymphadenopathy.  Cardiovascular: Regular rate. Regular rhythm. No murmurs, rubs, or gallops. Distal pulses are 2+ and symmetric.  Pulmonary/Chest: No respiratory distress. Clear to auscultation bilaterally. No wheezing, rales, or rhonchi.  Abdominal: Soft and non-distended.  There is no tenderness.  No rebound, guarding, or rigidity.  Good bowel sounds.    Musculoskeletal: Moves all extremities. No obvious deformities. No edema. No calf tenderness.  Skin: Warm and dry. Erythema to the maxillary area and forehead. No facial swelling.  Neurological:  Alert, awake, and appropriate.  Normal speech.  No acute focal neurological deficits are appreciated.  Psychiatric: Normal affect. Good eye contact. Appropriate in content.    ED Course    Procedures  ED Vital Signs:  Vitals:    01/25/17 1253   BP: 139/66   Pulse: 84   Resp: 18   Temp: 98.1 °F (36.7 °C)  "  TempSrc: Oral   SpO2: 98%   Weight: 93.9 kg (207 lb)   Height: 5' 4" (1.626 m)                The Emergency Provider reviewed the vital signs and test results, which are outlined above.    ED Discussion     1:17 PM: Discussed pt dx and plan of tx. Gave pt all f/u and return to the ED instructions. All questions and concerns were addressed at this time. Pt expresses understanding of information and instructions, and is comfortable with plan to discharge. Pt is stable for discharge.        ED Medication(s):  Medications   predniSONE tablet 60 mg (not administered)       New Prescriptions    PREDNISONE (DELTASONE) 50 MG TAB    Take 1 tablet (50 mg total) by mouth once daily.       Follow-up Information     Follow up with Primary Doctor No. Schedule an appointment as soon as possible for a visit in 2 days.            Medical Decision Making              Scribe Attestation:   Scribe #1: I performed the above scribed service and the documentation accurately describes the services I performed. I attest to the accuracy of the note.    Attending:   Physician Attestation Statement for Scribe #1: I, Bentley Escobar NP, personally performed the services described in this documentation, as scribed by Taco Melendez, in my presence, and it is both accurate and complete.          Clinical Impression       ICD-10-CM ICD-9-CM   1. Allergic reaction caused by a drug, initial encounter T78.40XA 995.27       Disposition:   Disposition: Discharged  Condition: Stable           Bentely Escobar NP  01/25/17 1323    "

## 2017-01-25 NOTE — ED AVS SNAPSHOT
[facility]  [id number] [facility] [address]               Lenora Santiago   2017 12:58 PM   ED    Description:  Female : 1960   Department:  [facility]            Your Care was Coordinated By:     Provider Role From To    Bentley Escobar NP Nurse Practitioner [phone] --      Reason for Visit     Allergic Reaction           Diagnoses this Visit        Comments    Allergic reaction caused by a drug, initial encounter    -  Primary       ED Disposition     None           To Do List           Follow-up Information     Follow up with Primary Doctor No. Schedule an appointment as soon as possible for a visit in 2 days.       These Medications        Disp Refills Start End    predniSONE (DELTASONE) 50 MG Tab 3 tablet 0 2017    Take 1 tablet (50 mg total) by mouth once daily. - Oral      [facility]     [facility] -  Assistance  Registered nurses in the [facility] provide clinical advisement, health education, appointment booking, and other advisory services.  Call for this free service at [phone].             Medications           Message regarding Medications     Verify the changes and/or additions to your medication regime listed below are the same as discussed with your clinician today.  If any of these changes or additions are incorrect, please notify your healthcare provider.        START taking these NEW medications        Refills    predniSONE (DELTASONE) 50 MG Tab 0    Sig: Take 1 tablet (50 mg total) by mouth once daily.    Class: Print    Route: Oral      These medications were administered today        Dose Freq    predniSONE tablet 60 mg 60 mg ED 1 Time    Sig: Take 3 tablets (60 mg total) by mouth ED 1 Time.    Class: Normal    Route: Oral           Verify that the below list of medications is an accurate representation of the medications you are  "currently taking.  If none reported, the list may be blank. If incorrect, please contact your healthcare provider. Carry this list with you in case of emergency.           Current Medications     cefdinir (OMNICEF) 300 MG capsule Take 300 mg by mouth 2 (two) times daily.    clindamycin (CLEOCIN) 150 MG capsule Take 2 capsules (300 mg total) by mouth 4 (four) times daily.    naproxen (NAPROSYN) 500 MG tablet Take 1 tablet (500 mg total) by mouth 2 (two) times daily with meals.    predniSONE (DELTASONE) 50 MG Tab Take 1 tablet (50 mg total) by mouth once daily.    predniSONE tablet 60 mg Take 3 tablets (60 mg total) by mouth ED 1 Time.    tramadol (ULTRAM) 50 mg tablet Take 1 tablet (50 mg total) by mouth every 6 (six) hours as needed.           Clinical Reference Information           Your Vitals Were     BP Pulse Temp Resp Height Weight    139/66 (BP Location: Right arm, Patient Position: Sitting) 84 98.1 °F (36.7 °C) (Oral) 18 5' 4" (1.626 m) 93.9 kg (207 lb)    SpO2 BMI             98% 35.53 kg/m2         Allergies as of 1/25/2017     No Known Allergies      Immunizations Administered on Date of Encounter - 1/25/2017     None      ED Micro, Lab, POCT     None      ED Imaging Orders     None        Discharge Instructions         Drug Reaction: Allergic  You are having an allergic reaction to a drug you have taken. This causes an itchy rash and sometimes swelling of various parts of the body. It may also cause trouble swallowing or breathing. The rash may take a few hours or up to 2 weeks to go away. In the future, remember to tell your doctor about your allergy to this drug so that drugs of this type won't be used again.  Any medicine can cause an allergic reaction. However, penicillin and related drugs, sulfa drugs, aspirin, ibuprofen, and seizure medicines cause the most allergic reactions. Vaccines may also trigger allergies. People whose parents or siblings have allergies are at a higher risk of developing a " drug allergy. Allergy testing may sometimes be required to determine the cause.  Symptoms may occur within minutes, hours, or even weeks after exposure to the drug. It can be a mild or severe reaction, or potentially life threatening. Most of us think of allergic reactions when we have a rash or itchy skin. Symptoms can include:  · Rash, hives, redness, welts, blisters  · Itching, burning, stinging, pain  · Dry, flaky, cracking, scaly skin  · Swelling of the face, lips or other parts of the body  · Fever.  Sometimes fever is the only symptom of a drug reaction.  In elderly people, the risk of fever increases with the number of drugs the older person takes.  More severe symptoms include:  · Trouble swallowing, feeling like your throat is closing  · Trouble breathing, wheezing  · Hoarse voice or trouble speaking  · Nausea, vomiting, diarrhea, stomach cramps  · Feeling faint or lightheaded, rapid heart rate  Home care    The goal of treatment is to help relieve the symptoms, and get you feeling better. Mild to moderate drug reactions usually respond quickly to antihistamines and steroids. The rash will usually fade over several days, but can sometimes last a couple of weeks. Over the next couple of days, there may be times when it is gets a little worse, and then better again. Here are some things to do:  · Throw the drug away and do not take it again. The next reaction may be much worse.  · Add this drug reaction to your electronic medical record.  · When getting a new medicine, always tell the healthcare provider that you are allergic to this drug. Make certain they write it down in your medical record.  · Avoid tight clothing and anything that heats up your skin (hot showers/baths, direct sunlight) since heat will make itching worse.  · An ice pack (ice cubes in a plastic bag, wrapped in a thin towel, or a frozen bag of peas) will relieve local areas of intense itching and redness.  Don't put ice directly on the  skin.  · Avoid scratching which may worsen the reaction, damage your skin and lead to an infection.  · Oral Benadryl (diphenhydramine) is an antihistamine available at drug and grocery stores. Unless a prescription antihistamine was given, Benadryl may be used to reduce itching if large areas of the skin are involved. It may make you sleepy, so be careful using it in the daytime or when going to school, working, or driving. [Note: Do not use Benadryl if you have glaucoma or if you are a man with trouble urinating due to an enlarged prostate.]  There are other antihistamines that cause less drowsiness and are a good alternative for daytime use. Ask your pharmacist for suggestions.  · Do not use Benadryl cream on your skin, because in some people it can cause a further reaction, and make you allergic to Benadryl.  · Calamine lotion or oatmeal baths sometimes help with itching.  Follow-up care  Follow up with your healthcare provider, or as advised if your symptoms do not continue to improve or they get worse.  Call 911  Call 911 if any of these occur:  · Trouble breathing or swallowing, wheezing  · New or worsening swelling in the mouth, throat, or tongue  · Hoarse voice or trouble speaking   · Fainting or loss of consciousness  · Rapid heart rate  · Low blood pressure  · Feeling of doom  · Nausea, vomiting, abdominal pain, diarrhea  When to seek medical advice  Call your healthcare provider right away if any of these occur:  · Shortness of breath  · Increased swelling in the face, eyelids, or lips  · Dizziness, weakness  · Continuing or recurring symptoms  · Spreading areas of itching, redness or swelling  · Signs of infection:  ¨ Spreading redness  ¨ Increased pain or swelling  ¨ Fever (1 degree above your normal temperature) lasting for 24 to 48 hours Or, whatever your healthcare provider told you to report based on your medical condition  ¨ Colored fluid draining from the inflamed area  © 8636-2142 [employer] [facility]  [employer]. [address] [place] [address]. All rights reserved. This information is not intended as a substitute for professional medical care. Always follow your healthcare professional's instructions.          [facility] Sign-Up     Activating your [facility] account is as easy as 1-2-3!     1) Visit [email or web address], select Sign Up Now, enter this activation code and your date of birth, then select Next.  [email or web address]  Expires: 3/10/2017 11:27 AM      2) Create a username and password to use when you visit [facility] in the future and select a security question in case you lose your password and select Next.    3) Enter your e-mail address and click Sign Up!    Additional Information  If you have questions, please e-mail [email or web address] or call [phone] to talk to our [facility] staff. Remember, [facility] is NOT to be used for urgent needs. For medical emergencies, dial 911.          [facility] complies with applicable Federal civil rights laws and does not discriminate on the basis of race, color, national origin, age, disability, or sex.        Language Assistance Services     ATTENTION: Language assistance services are available, free of charge. Please call [phone].      ATENCIÓN: Si habla español, tiene a niño disposición servicios gratuitos de asistencia lingüística. Llame al [phone].     CHÚ Ý: N?u b?n nói Ti?ng Vi?t, có các d?ch v? h? tr? ngôn ng? mi?n phí dành cho b?n. G?i s? [phone].

## 2017-01-25 NOTE — DISCHARGE INSTRUCTIONS
Drug Reaction: Allergic  You are having an allergic reaction to a drug you have taken. This causes an itchy rash and sometimes swelling of various parts of the body. It may also cause trouble swallowing or breathing. The rash may take a few hours or up to 2 weeks to go away. In the future, remember to tell your doctor about your allergy to this drug so that drugs of this type won't be used again.  Any medicine can cause an allergic reaction. However, penicillin and related drugs, sulfa drugs, aspirin, ibuprofen, and seizure medicines cause the most allergic reactions. Vaccines may also trigger allergies. People whose parents or siblings have allergies are at a higher risk of developing a drug allergy. Allergy testing may sometimes be required to determine the cause.  Symptoms may occur within minutes, hours, or even weeks after exposure to the drug. It can be a mild or severe reaction, or potentially life threatening. Most of us think of allergic reactions when we have a rash or itchy skin. Symptoms can include:  · Rash, hives, redness, welts, blisters  · Itching, burning, stinging, pain  · Dry, flaky, cracking, scaly skin  · Swelling of the face, lips or other parts of the body  · Fever.  Sometimes fever is the only symptom of a drug reaction.  In elderly people, the risk of fever increases with the number of drugs the older person takes.  More severe symptoms include:  · Trouble swallowing, feeling like your throat is closing  · Trouble breathing, wheezing  · Hoarse voice or trouble speaking  · Nausea, vomiting, diarrhea, stomach cramps  · Feeling faint or lightheaded, rapid heart rate  Home care    The goal of treatment is to help relieve the symptoms, and get you feeling better. Mild to moderate drug reactions usually respond quickly to antihistamines and steroids. The rash will usually fade over several days, but can sometimes last a couple of weeks. Over the next couple of days, there may be times when it is  gets a little worse, and then better again. Here are some things to do:  · Throw the drug away and do not take it again. The next reaction may be much worse.  · Add this drug reaction to your electronic medical record.  · When getting a new medicine, always tell the healthcare provider that you are allergic to this drug. Make certain they write it down in your medical record.  · Avoid tight clothing and anything that heats up your skin (hot showers/baths, direct sunlight) since heat will make itching worse.  · An ice pack (ice cubes in a plastic bag, wrapped in a thin towel, or a frozen bag of peas) will relieve local areas of intense itching and redness.  Don't put ice directly on the skin.  · Avoid scratching which may worsen the reaction, damage your skin and lead to an infection.  · Oral Benadryl (diphenhydramine) is an antihistamine available at drug and grocery stores. Unless a prescription antihistamine was given, Benadryl may be used to reduce itching if large areas of the skin are involved. It may make you sleepy, so be careful using it in the daytime or when going to school, working, or driving. [Note: Do not use Benadryl if you have glaucoma or if you are a man with trouble urinating due to an enlarged prostate.]  There are other antihistamines that cause less drowsiness and are a good alternative for daytime use. Ask your pharmacist for suggestions.  · Do not use Benadryl cream on your skin, because in some people it can cause a further reaction, and make you allergic to Benadryl.  · Calamine lotion or oatmeal baths sometimes help with itching.  Follow-up care  Follow up with your healthcare provider, or as advised if your symptoms do not continue to improve or they get worse.  Call 911  Call 911 if any of these occur:  · Trouble breathing or swallowing, wheezing  · New or worsening swelling in the mouth, throat, or tongue  · Hoarse voice or trouble speaking   · Fainting or loss of consciousness  · Rapid  heart rate  · Low blood pressure  · Feeling of doom  · Nausea, vomiting, abdominal pain, diarrhea  When to seek medical advice  Call your healthcare provider right away if any of these occur:  · Shortness of breath  · Increased swelling in the face, eyelids, or lips  · Dizziness, weakness  · Continuing or recurring symptoms  · Spreading areas of itching, redness or swelling  · Signs of infection:  ¨ Spreading redness  ¨ Increased pain or swelling  ¨ Fever (1 degree above your normal temperature) lasting for 24 to 48 hours Or, whatever your healthcare provider told you to report based on your medical condition  ¨ Colored fluid draining from the inflamed area  © 3338-9535 [employer]. [address] [place] [address], [place] [address]. All rights reserved. This information is not intended as a substitute for professional medical care. Always follow your healthcare professional's instructions.

## 2017-01-25 NOTE — TELEPHONE ENCOUNTER
Reason for Disposition   Message left on identified answering machine.    Protocols used: ST NO CONTACT OR DUPLICATE CONTACT CALL-A-AH  called in @ 620pm/ call returned @ 653 pm--no answer/ left message on VM of # listed in EPIC and  Return call # was listed in message    Rubina Rosenberg RN

## 2017-03-03 ENCOUNTER — TELEPHONE (OUTPATIENT)
Dept: UROLOGY | Facility: CLINIC | Age: 57
End: 2017-03-03

## 2017-03-03 NOTE — TELEPHONE ENCOUNTER
----- Message from Susannah Espino sent at 3/3/2017 10:35 AM CST -----  Contact: pt  Pt is returning nurse call. Pls call pt back at [phone]

## 2017-03-03 NOTE — TELEPHONE ENCOUNTER
----- Message from Noris Cote sent at 3/3/2017  9:25 AM CST -----  Patient would like to make a new patient appointment because she has blood in her urine.  She would like to come as soon as possible.    Call her at [phone].                                            blanchard

## 2018-03-22 ENCOUNTER — TELEPHONE (OUTPATIENT)
Dept: UROGYNECOLOGY | Facility: CLINIC | Age: 58
End: 2018-03-22

## 2018-03-22 NOTE — TELEPHONE ENCOUNTER
Spoke to pt and scheduled her for Dr. Keyes's next available and informed her I'd send an appointment letter in the mail. Pt voiced understanding and call ended.

## 2018-03-22 NOTE — TELEPHONE ENCOUNTER
----- Message from Indio Portillo sent at 3/22/2018 11:46 AM CDT -----  Contact: pt  x_ 1st Request  _ 2nd Request  _ 3rd Request    Who: pt    Why: is needing to schedule a new patient consult appointment    What Number to Call Back: [phone]    When to Expect a call back: (Before the end of the day)  -- if call after 3:00 call back will be tomorrow.

## 2018-04-12 ENCOUNTER — INITIAL CONSULT (OUTPATIENT)
Dept: UROGYNECOLOGY | Facility: CLINIC | Age: 58
End: 2018-04-12
Payer: COMMERCIAL

## 2018-04-12 ENCOUNTER — OFFICE VISIT (OUTPATIENT)
Dept: UROGYNECOLOGY | Facility: CLINIC | Age: 58
End: 2018-04-12
Payer: COMMERCIAL

## 2018-04-12 ENCOUNTER — TELEPHONE (OUTPATIENT)
Dept: UROGYNECOLOGY | Facility: CLINIC | Age: 58
End: 2018-04-12

## 2018-04-12 VITALS
BODY MASS INDEX: 36.35 KG/M2 | SYSTOLIC BLOOD PRESSURE: 120 MMHG | HEIGHT: 64 IN | WEIGHT: 212.94 LBS | DIASTOLIC BLOOD PRESSURE: 80 MMHG

## 2018-04-12 VITALS
WEIGHT: 212.94 LBS | SYSTOLIC BLOOD PRESSURE: 120 MMHG | DIASTOLIC BLOOD PRESSURE: 80 MMHG | BODY MASS INDEX: 36.35 KG/M2 | HEIGHT: 64 IN

## 2018-04-12 DIAGNOSIS — Z12.31 ENCOUNTER FOR SCREENING MAMMOGRAM FOR BREAST CANCER: ICD-10-CM

## 2018-04-12 DIAGNOSIS — N95.2 VAGINAL ATROPHY: ICD-10-CM

## 2018-04-12 DIAGNOSIS — Z12.4 CERVICAL CANCER SCREENING: ICD-10-CM

## 2018-04-12 DIAGNOSIS — R39.15 URINARY URGENCY: ICD-10-CM

## 2018-04-12 DIAGNOSIS — N39.43 POST-VOID DRIBBLING: ICD-10-CM

## 2018-04-12 DIAGNOSIS — R10.2 PELVIC PRESSURE IN FEMALE: ICD-10-CM

## 2018-04-12 DIAGNOSIS — K58.2 IRRITABLE BOWEL SYNDROME WITH BOTH CONSTIPATION AND DIARRHEA: ICD-10-CM

## 2018-04-12 DIAGNOSIS — N39.0 FREQUENT UTI: ICD-10-CM

## 2018-04-12 DIAGNOSIS — N39.0 FREQUENT UTI: Primary | ICD-10-CM

## 2018-04-12 LAB
BILIRUB SERPL-MCNC: NORMAL MG/DL
BLOOD URINE, POC: NORMAL
COLOR, POC UA: NORMAL
GLUCOSE UR QL STRIP: NORMAL
KETONES UR QL STRIP: NORMAL
LEUKOCYTE ESTERASE URINE, POC: NORMAL
NITRITE, POC UA: NORMAL
PH, POC UA: 5
PROTEIN, POC: NORMAL
SPECIFIC GRAVITY, POC UA: 1.01
UROBILINOGEN, POC UA: NORMAL

## 2018-04-12 PROCEDURE — 82270 OCCULT BLOOD FECES: CPT | Mod: S$GLB,,, | Performed by: OBSTETRICS & GYNECOLOGY

## 2018-04-12 PROCEDURE — 52000 CYSTOURETHROSCOPY: CPT | Mod: S$GLB,,, | Performed by: OBSTETRICS & GYNECOLOGY

## 2018-04-12 PROCEDURE — 99499 UNLISTED E&M SERVICE: CPT | Mod: S$GLB,,, | Performed by: OBSTETRICS & GYNECOLOGY

## 2018-04-12 PROCEDURE — 99999 PR PBB SHADOW E&M-EST. PATIENT-LVL III: CPT | Mod: PBBFAC,,,

## 2018-04-12 PROCEDURE — 81002 URINALYSIS NONAUTO W/O SCOPE: CPT | Mod: S$GLB,,, | Performed by: OBSTETRICS & GYNECOLOGY

## 2018-04-12 PROCEDURE — 99213 OFFICE O/P EST LOW 20 MIN: CPT | Mod: PBBFAC | Performed by: OBSTETRICS & GYNECOLOGY

## 2018-04-12 PROCEDURE — 99999 PR PBB SHADOW E&M-EST. PATIENT-LVL III: CPT | Mod: PBBFAC,,, | Performed by: OBSTETRICS & GYNECOLOGY

## 2018-04-12 PROCEDURE — 88175 CYTOPATH C/V AUTO FLUID REDO: CPT

## 2018-04-12 PROCEDURE — 87086 URINE CULTURE/COLONY COUNT: CPT

## 2018-04-12 PROCEDURE — 99205 OFFICE O/P NEW HI 60 MIN: CPT | Mod: 25,S$GLB,, | Performed by: OBSTETRICS & GYNECOLOGY

## 2018-04-12 RX ORDER — CIPROFLOXACIN 500 MG/1
500 TABLET ORAL
Status: COMPLETED | OUTPATIENT
Start: 2018-04-12 | End: 2018-04-12

## 2018-04-12 RX ORDER — LIDOCAINE HYDROCHLORIDE 20 MG/ML
JELLY TOPICAL ONCE
Status: COMPLETED | OUTPATIENT
Start: 2018-04-12 | End: 2018-04-12

## 2018-04-12 RX ADMIN — CIPROFLOXACIN 500 MG: 500 TABLET ORAL at 05:04

## 2018-04-12 RX ADMIN — LIDOCAINE HYDROCHLORIDE 5 ML: 20 JELLY TOPICAL at 04:04

## 2018-04-12 NOTE — TELEPHONE ENCOUNTER
Spoke with Yady from [facility] and she stated no auth is required its upon medical necessity and the ref#[id number].

## 2018-04-12 NOTE — PATIENT INSTRUCTIONS
UTI PREVENTION: (from [employer])  Urinary Tract Infection (UTI)  More than 4 million doctor office visits per year in the [place] are for urinary tract infections (UTI). About 12% of men and 50% of women will have a UTI during his or her lifetime. Most UTIs arise from bacteria that normally live in the colon and rectum and are present in bowel movements. These bacteria cling to the opening of the urethra, begin to multiply, & travel up to the bladder. Urine flow from the bladder usually washes bacteria out of the body. However, because women have a shorter urethra than men, bacteria can reach the bladder more easily and settle into the bladder wall. This is why women are more likely to develop UTIs than men. This risk factor is exacerbated by the greater likelihood that women introduce bacteria from fecal matter, following a bowel movement, into the urinary tract. Less often, bacteria can spread to the kidney from the bloodstream. A recurrent UTI is classified as three or more a year.  Risk Factors for UTI  Several factors can contribute to the risk of UTI. Sexual intercourse, use of contraceptive spermicide, low levels of estrogen, catheterization, diabetes, pregnancy, and immune suppression increase susceptibility to UTI.  Naturally occurring estrogen helps prevent recurrent UTI in women. After menopause, estrogen levels drop along with the number of vaginal lactobacilli, the good bacteria which prevent growth of intestinal bacteria in the vagina. This makes postmenopausal women especially susceptible to UTI.  Catheters also present a risk of recurring UTI. Catheters are associated with colonization of bacteria and increased risks of clinical infection. Using the techniques described previously can help keep catheters clean and prevent recurrent UTI. While single-use of sterile catheters reduces the risks, it does not prevent UTI from occurring. It is therefore important to  maintain proper care and use of catheters at all times while remaining alert to symptoms of UTI.  Common Symptoms of UTI   Painful urination   Frequency   Urgency   Lower abdominal or pelvic pain or pressure   Blood in the urine   Milky, cloudy, or pink/red urine   Fever   Strong smelling urine  In the elderly populations, symptoms of a urinary tract infection, can be easily overlooked, causing a delay in diagnosis. Elderly people with a UTI are more likely than younger people not to be diagnosed until the complication of sepsis occurs. The elderly often do not experience or report obvious symptoms that younger people have, such as difficult urination, or frequent urination. Instead they may exhibit far more vague symptoms that are similiar to many disease or may be assumed to be due to the aging process. These symptoms include: confusion, feelings of general discomfort, disorientation, fatigue, weakness, behavior changes, falling, and/or a new, acute incontinence. In addition, because incontinence is common in the elderly, they may not be aware of the symptom of frequency. If you experience any of these symptoms, see your healthcare professional.  Types of UTIs   Cystitis - an inflammation of the bladder and the most common UTI. Almost always, it occurs in women. The infection typically affects only the surface of the bladder and is brief & acute.   Pyelonephritis - more commonly known as a kidney infection and usually occurs when a lower UTI spreads to the kidneys. Occasionally, bacteria will spread to the kidney from the bloodstresam. Kidney infections are more serious and less common than bladder infections. Symptoms include a fever, pain in the back or side below the ribs, nausea, or vomiting.   Urethritis - is an inflammation of the urethra. Men commonly contract urethritis through sexual intercourse with partners infected with sexually transmitted infections. Urethritis may also result from trauma  to the area or from the catheterization process.  Prevention of UTI  There are several ways to prevent bladder infections.  Bathroom Behavior:Periodically emptying the bladder by trying to urinate every two to three hours.  Diet:The use of cranberry products seems to decrease the ability of bacteria to adhere to the lining of the urethra and bladder. As cranberry juice can have a high amount of sugar, cranberry extract can be taken in capsule or pill form instead. Increasing water intake by one or two glasses per day may help limit the length of time that you have symptoms and reduce the infections.  Hygiene: Proper hygiene in caring for the urethral area is another way to limit the amount or type of bacteria that can be drawn into the urethra. This is especially true in people who have decreased sensation in the perineal region such as those with MS or who experience any amount of fecal incontinence. Using soap & water or commercially available cleansing wipes several times per day & frequent changing of incontinence pads as they become wet can minimize the amount of bacteria in the urethral area. Women should always wipe from the front of the vagina to the back of the anus after urination or a bowel movement and wear cotton underwear. It is also reported that wearing thong undergarments may increase one's risk of developing an infection.  Sexual Activity: Can be the source of UTIs in women. Insuring proper lubrication to the vagina and voiding before and after intercourse are tactics to help prevent infection. Using diaphragms and spermicidal jelly and/or foam may increase the risk of infection, so it is important to evaluate what type of birth control you use. Some physicians encourage women who have a history of recurrent infections to take an prophylactic antibiotic after intercourse, as it reduces risk of recurrent UTI by about 85%. At a minimum, restrict your number of sexual partners and urinate  immediately after sexual intimacy to lower the risk of recurrent UTIs.  Vaginal Estrogen: reduces risk of recurrent UTI by repopulating the normal vaginal lactobacilli that keep bacteria from the rectum from multiplying and causing a bladder infection. Forms of vaginal estrogen are available at very low dosages that have minimal systemic absorption.  Catheter Use: proper cleansing and storage of catheters is important in one who intermittently catheterizes, as the catheter can be a vehicle to introduce infection if the technique is incorrect or if the catheters are not properly cleaned between each use. A closed system catheter provides a reliable means of sterile IC because the introducer tip is surrounded by a urine collection bag and never exposed to bacteria typically found at the urethral opening. This greatly reduces the risk of infection.  NOTE: Vaginal estrogens and antibiotics are medications that need to be prescribed by your healthcare provider.  Treatment of UTI  Depending on the severity of infection, UTI can be treated with oral antibiotics. A three-day course of antibiotics can treat a simple UTI. However, some infections may need to be treated for several days or weeks. Length of antibiotic treatment also depends on the type of antibiotic prescribed.  Even if a few doses of medication relieve some symptoms, you should still complete the full course of medication prescribed by your doctor. Taking aspirin, Tylenol, or non-steroidal, anti-inflammatory medications may reduce symptoms during this episode, as they may be a result of increased body temperature.  For more information regarding UTIs please visit: http://www.ncbi.nlm.nih.gov/pubmedhealth/SWW9035816/    -----------------------------------------------------------------    Bladder Irritants  Certain foods and drinks have been associated with worsening symptoms of urinary frequency, urgency, urge incontinence, or bladder pain. If you suffer  from any of these conditions, you may wish to try eliminating one or more of these foods from your diet and see if your symptoms improve. If bladder symptoms are related to dietary factors, strict adherence to a diet thateliminates the food should bring marked relief in 10 days. Once you are feeling better, you can begin to add foods back into your diet, one at a time. If symptoms return, you will be able to identify the irritant. As you add foods back to your diet it is very important that you drink significant amounts of water.    -----------------------------------------------------------------------------------------------  List of Common Bladder Irritants*  Alcoholic beverages  Apples and apple juice  Cantaloupe  Carbonated beverages  Chili and spicy foods  Chocolate  Citrus fruit  Coffee (including decaffeinated)  Cranberries and cranberry juice  Grapes  Guava  Milk Products: milk, cheese, cottage cheese, yogurt, ice cream  Peaches  Pineapple  Plums  Strawberries  Sugar especially artificial sweeteners, saccharin, aspartame, corn sweeteners, honey, fructose, sucrose, lactose  Tea  Tomatoes and tomato juice  Vitamin B complex  Vinegar  *Most people are not sensitive to ALL of these products; your goal is to find the foods that make YOUR symptoms worse.  ---------------------------------------------------------------------------------------------------    Low-acid fruit substitutions include apricots, papaya, pears and watermelon. Coffee drinkers can drink Kava or other lowacid instant drinks. Tea drinkers can substitute non-citrus herbal and sun brewed teas. Calcium carbonate co-buffered with calcium ascorbate can be substituted for Vitamin C. Prelief is a dietary supplement that works as an acid blocker for the bladder.    Where to get more information:        Overcoming Bladder Disorders by Tamela Means and Elzbieta Melendrez   [facility], 1990        You Dont Have to Live with Cystitis! By  Carly Gonzalez, 1988  · http://www.urologymanagement.org/oab    ------------------------------------------------------    Fiber Information Sheet  Your doctor has recommended that you follow a high fiber diet. The addition of fiber to your diet can make an enormous difference in your bowel control and regularity. Fiber helps people whether they lose stool or have trouble with constipation. Fiber works by bulking the stool and keeping it formed, yet making the movement soft and easy to pass. Fiber helps keep moisture within the stool so that neither diarrhea nor hard stool occurs. Fiber makes the bowels work more regularly, but it is not a laxative. An additional bonus from eating a high fiber diet is that your risk of cancer is reduced, too.    Most of us eat some high fiber foods already, but nearly all of us do not eat the necessary amount. For example, a slice of whole wheat bread contains only about 10% of the daily recommended amount of fiber. This means if you are relying on only whole wheat bread to meet the recommended fiber requirements, you would need to eat  between 10-18 slices every day! Please note that fiber is NOT in any meat or dairy product. It is only found in grains, vegetables and fruits. The recommended daily fiber intake is 20-25 grams. Foods having high fiber content include:     Fiber One Cereal, ½ cup 13.0 g   Pizarro beans, ¾ cup 10.4 g   Wheat bran cereal, 1 oz 10.0 g   Kidney beans, ¾ cup 9.3 g   All Bran Cereal, ½ cup 6.0 g   Oat Bran Cereal, hot, 1 oz 4.0 g   Banana, 1medium 3.8 g   Canned pears, ½ cup 3.7 g   3 prunes or ¼ cup raisins 3.5 g   Whole Wheat Total, 1 cup 3.0 g   Carrots, ½ cup 3.2 g   Apple, small 2.8 g   Broccoli, ½ cup 2.8 g   Cauliflower, ½ cup 2.6 g   Oatmeal, 1 oz 2.5 g   Whole Wheat Toast 2.0 g   Cheerios, 1 1/3 cup 2.0 g   Baked potato with skin 2.0 g   Corn, ½ cup 1.9 g   Popcorn, 3 cups 1.9 g   Orange, medium 1.9 g   Granola bar 1.0 g   Lettuce, ½ cup 0.9  g    If you dont think that you can get enough fiber through your everyday diet, there are many good fiber supplements you can take along with eating your high fiber diet. Some of these are: Metamucil (1 heaping teaspoon or 1-2 wafers), Citrucel (1 tablespoon), Fiberall (1-2 wafers or 1 teaspoon), Perdium (2 rounded teaspoons) and 1-2 teaspoons unprocessed bran (to mix with foods)    You may need to use the fiber supplement up to 3-4 times daily to produce normal elimination. Please follow specific package directions or call us for help in regulating the dose. You may notice some bloating and/or increased gas at first. These symptoms can be relieved by adding fiber to your diet slowly. Once your body gets used to this increased fiber, these symptoms will go away.   -----------------------------------------------      1)  Frequent UTIs (bladder infections):  --urine C&S sent today  --If you feel like you have a UTI, please call our office so that we can place an order for you to drop off a urine specimen at the closest [facility] lab (we will arrange).     --We will call in antibiotics for you to start right after you drop off specimen.     --In this way, we can determine:     1)  Do you have a UTI?      2) If you have a UTI, is it sensitive to the antibiotics we prescribed?   --follow UTI prevention tips (see attached)  --control bowel movements/fecal cross-contamination  --treat vaginal atrophy (dryness)  --empty bladder before and after intercourse  --continue taking 1 probiotic pill (any with lactobacillus and/or acidophilus) daily  --CT A/P from OSH 2018 reviewed: no obvious contributors; will do cystoscopy    2)  Vaginal atrophy (dryness):  Use 0.5 gram of estrogen cream in vagina nightly x 2 weeks, then twice a week thereafter.  Also use small amount with finger around vaginal opening/inner lips at same frequency.      3)  Irritable bowel-C/D:    --given handout  --look at diet for exacerbating foods and  decrease; keep diary (see handout)  --work on stress control; this can affect bowel movements  --increase hydration: drink 3-4 bottles of water/day in addition to what you drink with meals  --watch caffeine intake; make sure to replace lost water after/before drinking caffeine  --get regular exercise  --increase fiber: Start daily fiber.  Take 1 tsp of fiber powder (psyllium or other sugar-free powder).  Mix in 8 oz of water.  Take x 3-5 days.  Then, increase fiber by 1 tsp every 3-5 days until stool is easy to pass/well-formed/less loose episodes.  Stop and continue at that dose.   Do not exceed 6 tsps/day. GOAL:  More well-formed stool (one continuous well-formed piece vs. Pellets) and minimize straining with initiation.    --start daily magnesium oxide 400 IU or mg a day  --continue daily probiotic pill (lactobacillis, acidophilus)--any brand ok  --for constipation: use stool softener (ducosate sodium) 1-2 times/day  --if having trouble completing evacuation:  Get small footstool (less than 6 inches) or large book and place feet on when using bathroom   --consider medication if refractory; consider evaluation for low motility syndrome if refractory   --rectocele: absent    4)  Post-void urgency, pelvic pressure, post-void dribbling:   --urine C&S  --Empty bladder every 3 hours.  Empty well: wait a minute, lean forward on toilet.    --Avoid dietary irritants (see sheet).  Keep diary x 3-5 days to determine your irritants.  --start pelvic floor PT/call in a few days to make appt:  [facility].  Dana Bradley:  p) [phone].   --URGE: consider medication in future.  Takes 2-4 weeks to see if will have effect.  For dry mouth: get sour, sugar free lozenge or gum.      5)  Well-woman:  --pap done today  --Mammogram: ordered; please schedule  --Colonoscopy: please schedule locally    6)  RTC for cystourethroscopy.

## 2018-04-12 NOTE — PROGRESS NOTES
[facility] [address] [place] [address]    Lenora Santiago  [id number]  1960    Consulting Physician: Self, Aaareferral   GYN: none  Primary M.D.: Primary Doctor No    Chief Complaint   Patient presents with    recurrent UTI    Abdominal Pain       HPI:     1)  UI:  (--) Daytime frequency: Q 5 hours.  Nocturia: No  (--) dysuria,  (+) hematuria,  (+) frequent UTIs.  (--) complete bladder emptying. Two UTI in the last year, one last July with jericho blood (C&S per URO), and the last one in Feb (C&S per URO). The most recent UTI had jericho blood in it. This caused her to go to the urologist.  Was 7 day course of bactrim as per urologist, and resolved the UTI. Most recent bladder scan was done and was benign. Since last UTI has had some feeling of incomplete emptying (Credes to help with small amount) and PV dribbling.     2)  POP:  Absent. (--) vaginal bleeding. - vaginal discharge. (--) sexually active.  (--) dyspareunia. (--)  Vaginal dryness.  (--) vaginal estrogen use.     3)  BM:  (--) constipation/straining.  Has irritable bowel with no pain. Flares every 6 weeks, induced by stress/poor sleep/eating wrong foods late at night.  Does not take any medications for this, but takes pro-biotics. Does not currently have issues, pain, or flare-ups.(--) hematochezia.  (--) fecal incontinence.  (--) fecal smearing/urgency.  (+) complete evacuation.      Past Medical History  History reviewed. No pertinent past medical history.     Past Surgical History  Past Surgical History:   Procedure Laterality Date    APPENDECTOMY     xlap/appy    Hysterectomy: No      Past Ob History     x 2.  .    Largest infant weight: 9lb 13 oz  unknown FAVD. no episiotomy.  Had rectal tears during the pregnancy?    Gynecologic History  LMP: No LMP recorded. Patient is postmenopausal.  Age of menarche: 9  Age of menopause: 50; Was on replacement hormones when she initially  started menopause for sleep deprivation. Course was for 3 months.   Menstrual history: Painful, sporadic, periods were 28-31 days.   Pap test: No.  History of abnormal paps: No.  History of STIs:  No  Mammogram: Date of last: 2015.  Result: Normal (fibrocystic changes)  Colonoscopy: Date of last: Never had one. Repeat due:  Now.        Family History  Family History   Problem Relation Age of Onset    Cervical cancer Neg Hx     Endometrial cancer Neg Hx     Vaginal cancer Neg Hx     Ovarian cancer Neg Hx       Colon CA: Yes - Paternal Aunt  Breast CA: Yes - Paternal Aunt  GYN CA: No   CA: Yes - Mother (kidney cancer)    Social History  History   Smoking Status    Never Smoker   Smokeless Tobacco    Never Used   .  History   Alcohol Use No   .    History   Drug Use No     Resides in [place] [id number].  Employment status: retired.    ER unit secretary    Allergies  Review of patient's allergies indicates:   Allergen Reactions    Clindamycin Rash       Medications  Current Outpatient Prescriptions on File Prior to Visit   Medication Sig Dispense Refill    cefdinir (OMNICEF) 300 MG capsule Take 300 mg by mouth 2 (two) times daily.      naproxen (NAPROSYN) 500 MG tablet Take 1 tablet (500 mg total) by mouth 2 (two) times daily with meals. 30 tablet 0     No current facility-administered medications on file prior to visit.        Review of Systems A 14 point ROS was reviewed with pertinent positives as noted above in the history of present illness.      Constitutional: negative  Eyes: negative  Endocrine: negative  Gastrointestinal: negative  Cardiovascular: negative  Respiratory: negative  Allergic/Immunologic: negative  Integumentary: negative  Psychiatric: negative  Musculoskeletal: negative   Ear/Nose/Throat: negative  Neurologic: negative  Genitourinary: SEE HPI  Hematologic/Lymphatic: negative   Breast: negative    Urogynecologic Exam  /80 (BP Location: Right arm, Patient Position: Sitting)   Ht  "5' 4" (1.626 m)   Wt 96.6 kg (212 lb 15.4 oz)   BMI 36.56 kg/m²     GENERAL APPEARANCE:  The patient is well-developed, well-nourished.  Neck:  Supple with no thyromegaly, no carotid bruits.  Heart:  Regular rate and rhythm, no murmurs, rubs or gallops.  Lungs:  Clear.  No CVA tenderness.  Abdomen:  Soft, nontender, nondistended, no hepatosplenomegaly.  Incisions:  RLQ appy well-healed    PELVIC:    External genitalia:  Normal Bartholins, Skenes and labia bilaterally.    Urethra:  No caruncle, diverticulum or masses.  (+) hypermobility.    Vagina:  Atrophy (+) , no bladder masses or tender, no discharge.    Cervix:  normal appearance  Uterus: normal size, contour, position, consistency, mobility, non-tender  Adnexa: Not palpable.    POP-Q:    Deferred.  No obvious POP present with valsalva.     NEUROLOGIC:  Cranial nerves 2 through 12 intact.  Strength 5/5.  DTRs 2+ lower extremities.  S2 through 4 normal.  Sacral reflexes intact.    EXT: YARBROUGH, 2+ pulses bilaterally, no C/C/E    COUGH STRESS TEST:  negative  KEGEL: 1 /5    RECTAL:    External:  Normal, (--) hemorrhoids, (--) dovetailing.   Internal:   (--) tenderness, (--) masses, Normal resting tone, Normal active tone. HEME neg    PVR: 30 mL    Impression    1. Frequent UTI    2. Cervical cancer screening    3. Vaginal atrophy    4. Encounter for screening mammogram for breast cancer    5. Urinary urgency    6. Post-void dribbling    7. Pelvic pressure in female    8. Irritable bowel syndrome with both constipation and diarrhea        Initial Plan  The patient was counseled regarding these issues. The patient was given a summary sheet containing each of these issues with possible options for evaluation and management. When appropriate, we also reviewed computer-generated diagrams specific to their diagnoses..  All questions were addressed to the patient's satisfaction.    1)  Frequent UTIs (bladder infections):  --urine C&S sent today  --If you feel like you have " a UTI, please call our office so that we can place an order for you to drop off a urine specimen at the closest [facility] lab (we will arrange).     --We will call in antibiotics for you to start right after you drop off specimen.     --In this way, we can determine:     1)  Do you have a UTI?      2) If you have a UTI, is it sensitive to the antibiotics we prescribed?   --follow UTI prevention tips (see attached)  --control bowel movements/fecal cross-contamination  --treat vaginal atrophy (dryness)  --empty bladder before and after intercourse  --continue taking 1 probiotic pill (any with lactobacillus and/or acidophilus) daily  --CT A/P from OSH 2018 reviewed: no obvious contributors; will do cystoscopy    2)  Vaginal atrophy (dryness):  Use 0.5 gram of estrogen cream in vagina nightly x 2 weeks, then twice a week thereafter.  Also use small amount with finger around vaginal opening/inner lips at same frequency.      3)  Irritable bowel-C/D:    --given handout  --look at diet for exacerbating foods and decrease; keep diary (see handout)  --work on stress control; this can affect bowel movements  --increase hydration: drink 3-4 bottles of water/day in addition to what you drink with meals  --watch caffeine intake; make sure to replace lost water after/before drinking caffeine  --get regular exercise  --increase fiber: Start daily fiber.  Take 1 tsp of fiber powder (psyllium or other sugar-free powder).  Mix in 8 oz of water.  Take x 3-5 days.  Then, increase fiber by 1 tsp every 3-5 days until stool is easy to pass/well-formed/less loose episodes.  Stop and continue at that dose.   Do not exceed 6 tsps/day. GOAL:  More well-formed stool (one continuous well-formed piece vs. Pellets) and minimize straining with initiation.    --start daily magnesium oxide 400 IU or mg a day  --continue daily probiotic pill (lactobacillis, acidophilus)--any brand ok  --for constipation: use stool softener (ducosate sodium) 1-2  times/day  --if having trouble completing evacuation:  Get small footstool (less than 6 inches) or large book and place feet on when using bathroom   --consider medication if refractory; consider evaluation for low motility syndrome if refractory   --rectocele: absent    4)  Post-void urgency, pelvic pressure, post-void dribbling:   --urine C&S  --Empty bladder every 3 hours.  Empty well: wait a minute, lean forward on toilet.    --Avoid dietary irritants (see sheet).  Keep diary x 3-5 days to determine your irritants.  --start pelvic floor PT/call in a few days to make appt:  [facility].  Dana Chapisjohnny:  p) [phone].   --URGE: consider medication in future.  Takes 2-4 weeks to see if will have effect.  For dry mouth: get sour, sugar free lozenge or gum.      5)  Well-woman:  --pap done today  --Mammogram: ordered; please schedule  --Colonoscopy: please schedule locally    6)  Cystourethroscopy normal today.  Follow plan as above.     Approximately 60 min were spent in consult, 90 % in discussion.     Thank you for requesting consultation of your patient.  I look forward to participating in their care.    Umm Keyes  Female Pelvic Medicine and Reconstructive Surgery  [facility] [place], [place]

## 2018-04-13 LAB — BACTERIA UR CULT: NO GROWTH

## 2018-04-14 PROBLEM — N39.0 FREQUENT UTI: Status: ACTIVE | Noted: 2018-04-14

## 2018-04-14 PROBLEM — Z12.4 CERVICAL CANCER SCREENING: Status: ACTIVE | Noted: 2018-04-14

## 2018-04-14 PROBLEM — N95.2 VAGINAL ATROPHY: Status: ACTIVE | Noted: 2018-04-14

## 2018-04-14 PROBLEM — R39.15 URINARY URGENCY: Status: ACTIVE | Noted: 2018-04-14

## 2018-04-14 PROBLEM — Z12.31 ENCOUNTER FOR SCREENING MAMMOGRAM FOR BREAST CANCER: Status: ACTIVE | Noted: 2018-04-14

## 2018-04-14 PROBLEM — K58.2 IRRITABLE BOWEL SYNDROME WITH BOTH CONSTIPATION AND DIARRHEA: Status: ACTIVE | Noted: 2018-04-14

## 2018-04-14 PROBLEM — R10.2 PELVIC PRESSURE IN FEMALE: Status: ACTIVE | Noted: 2018-04-14

## 2018-04-14 PROBLEM — N39.43 POST-VOID DRIBBLING: Status: ACTIVE | Noted: 2018-04-14

## 2018-04-14 NOTE — PROGRESS NOTES
Title of Operation:   Cystourethroscopy.     INDICATIONS:  1)  UI:  (--) Daytime frequency: Q 5 hours.  Nocturia: No  (--) dysuria,  (+) hematuria,  (+) frequent UTIs.  (--) complete bladder emptying. Two UTI in the last year, one last July with jericho blood (C&S per URO), and the last one in Feb (C&S per URO). The most recent UTI had jericho blood in it. This caused her to go to the urologist.  Was 7 day course of bactrim as per urologist, and resolved the UTI. Most recent bladder scan was done and was benign. Since last UTI has had some feeling of incomplete emptying (Credes to help with small amount) and PV dribbling.      2)  POP:  Absent. (--) vaginal bleeding. - vaginal discharge. (--) sexually active.  (--) dyspareunia. (--)  Vaginal dryness.  (--) vaginal estrogen use.      3)  BM:  (--) constipation/straining.  Has irritable bowel with no pain. Flares every 6 weeks, induced by stress/poor sleep/eating wrong foods late at night.  Does not take any medications for this, but takes pro-biotics. Does not currently have issues, pain, or flare-ups.(--) hematochezia.  (--) fecal incontinence.  (--) fecal smearing/urgency.  (+) complete evacuation.         1. Frequent UTI    2. Cervical cancer screening    3. Vaginal atrophy    4. Encounter for screening mammogram for breast cancer    5. Urinary urgency    6. Post-void dribbling    7. Pelvic pressure in female    8. Irritable bowel syndrome with both constipation and diarrhea      POSTOPERATIVE DIAGNOSIS:      1. Frequent UTI    2. Cervical cancer screening    3. Vaginal atrophy    4. Encounter for screening mammogram for breast cancer    5. Urinary urgency    6. Post-void dribbling    7. Pelvic pressure in female    8. Irritable bowel syndrome with both constipation and diarrhea      Anesthesia:   2% Xylocaine gel.    Specimen (Bacteriological, Pathological or other):   None.     Prosthetic Device/Implant:   None.     Surgeons Narrative:     After informed consent was  obtained, the patient was placed in the lithotomy position. The urethral meatus was prepped with Betadine and 10 cubic centimeters of 2% Xylocaine gel were introduced into the urethra. A flexible cystourethroscope was introduced into the bladder. The bladder was distended with approximately 300 cubic centimeters of sterile water. A systematic survey was performed in which the bladder was surveyed using multiple sequential passes in a clockwise fashion from the bladder dome to the bladder base to the urethrovesical junction. The trigone and ureteral orifices were observed. The scope was then flipped back on itself, and the urethrovesical junction was viewed. A vaginal examining finger was then placed with pressure suburethrally at the urethrovesical junction as the telescope was withdrawn in order to perform positive pressure urethroscopy.  Standard maneuvers of cough, squeeze and Valsalva were performed. The telescope was then completely withdrawn.     Findings: Urethroscopy:  Normal.  Cystoscopy:  Normal bladder mucosa, bilateral ureteral flow was noted.     Assessment: Essentially normal cystourethroscopy.     Plan: The patient will follow up with Dr. Keyes as scheduled.  See clinic note from 4/12/18 for further plan details. '

## 2018-04-16 ENCOUNTER — TELEPHONE (OUTPATIENT)
Dept: UROGYNECOLOGY | Facility: CLINIC | Age: 58
End: 2018-04-16

## 2018-04-19 ENCOUNTER — TELEPHONE (OUTPATIENT)
Dept: UROGYNECOLOGY | Facility: CLINIC | Age: 58
End: 2018-04-19

## 2018-05-02 ENCOUNTER — HOSPITAL ENCOUNTER (OUTPATIENT)
Dept: RADIOLOGY | Facility: HOSPITAL | Age: 58
Discharge: HOME | End: 2018-05-02
Attending: OBSTETRICS & GYNECOLOGY
Payer: COMMERCIAL

## 2018-05-02 VITALS — WEIGHT: 212 LBS | BODY MASS INDEX: 36.19 KG/M2 | HEIGHT: 64 IN

## 2018-05-02 DIAGNOSIS — Z12.31 ENCOUNTER FOR SCREENING MAMMOGRAM FOR BREAST CANCER: ICD-10-CM

## 2018-05-02 PROCEDURE — 77067 SCR MAMMO BI INCL CAD: CPT | Mod: 26,,, | Performed by: RADIOLOGY

## 2018-05-02 PROCEDURE — 77067 SCR MAMMO BI INCL CAD: CPT | Mod: TC,PO

## 2018-05-04 ENCOUNTER — TELEPHONE (OUTPATIENT)
Dept: UROGYNECOLOGY | Facility: CLINIC | Age: 58
End: 2018-05-04

## 2018-05-04 NOTE — TELEPHONE ENCOUNTER
----- Message from Umm Keyes MD sent at 5/3/2018 11:16 AM CDT -----  Please let patient know her mammogram was normal. Tried to call but no answer. If not answer after 3 call attempts, please send certified letter stating mammogram was normal.  Thanks!

## 2018-05-04 NOTE — TELEPHONE ENCOUNTER
Spoke with pt and relayed message that mammogram was normal pt voiced understanding and call was ended.

## 2018-05-22 ENCOUNTER — CLINICAL SUPPORT (OUTPATIENT)
Dept: REHABILITATION | Facility: HOSPITAL | Age: 58
End: 2018-05-22
Payer: COMMERCIAL

## 2018-05-22 DIAGNOSIS — N39.0 FREQUENT UTI: Primary | ICD-10-CM

## 2018-05-22 DIAGNOSIS — R39.15 URINARY URGENCY: ICD-10-CM

## 2018-05-22 DIAGNOSIS — R10.2 PELVIC PRESSURE IN FEMALE: ICD-10-CM

## 2018-05-22 DIAGNOSIS — K58.2 IRRITABLE BOWEL SYNDROME WITH BOTH CONSTIPATION AND DIARRHEA: ICD-10-CM

## 2018-05-22 PROCEDURE — 97161 PT EVAL LOW COMPLEX 20 MIN: CPT | Performed by: PHYSICAL THERAPIST

## 2018-05-22 PROCEDURE — 97535 SELF CARE MNGMENT TRAINING: CPT | Performed by: PHYSICAL THERAPIST

## 2018-05-22 NOTE — PROGRESS NOTES
OUTPATIENT PHYSICAL THERAPY EVALUATION        Patient: Lenora Lopez [facility] #:  [id number]    Date of treatment: 05/22/2018   # Visits: 1/20      HISTORY      Lenora is a 57 y.o. female evaluated on 05/22/2018. Pt presents to PT with complaints of vaginal and abdominal pain and pressure especially when standing or constipated. Pt also complains of low back pain and R hip pain due to OA    Physician:  Umm Keyes MD   Diagnosis:   Encounter Diagnoses   Name Primary?    Frequent UTI Yes    Urinary urgency     Pelvic pressure in female     Irritable bowel syndrome with both constipation and diarrhea       Treatment ordered: Physical Therapy  Medical History: No past medical history on file.   Surgical History:   Past Surgical History:   Procedure Laterality Date    APPENDECTOMY        Medications:   Current Outpatient Prescriptions   Medication Sig    cefdinir (OMNICEF) 300 MG capsule Take 300 mg by mouth 2 (two) times daily.    conjugated estrogens (PREMARIN) vaginal cream 0.5 g with applicator or dime-sized amt with finger in vagina nightly x 2 weeks, then twice a week thereafter    naproxen (NAPROSYN) 500 MG tablet Take 1 tablet (500 mg total) by mouth 2 (two) times daily with meals.     No current facility-administered medications for this visit.        Allergies:   Review of patient's allergies indicates:   Allergen Reactions    Clindamycin Rash        Precautions: none    OB/GYN History:  childbirth vaginal delivery, episiotomy, vaginal dryness, menopause and pelvic pain. Vaginal delivery 30 years ago left rectal tearing. Frequent UTI    Bladder/Bowel History: Pt reports IBS- bouts of diarrhea and constipation      SUBJECTIVE     Date of onset: gradually began and worsening over 2 years  Patient's goals for therapy: stand and walk without pressure and pain in vagina  Pain: Pt denies pain- just uncomfortable    Activities that cause symptoms: strong urge to go, prolonged standing and  defecating    Previous treatment included none    Sexually active no    Frequency of urination:   Daytime: every 8 hours           Nighttime: 2x    Difficulty initiating urine stream: no  Urine stream: weak and splayed  Complete emptying: no- needs to double void by leaning forward on toilet  Bladder leakage: none  Frequency of incidents:none  Amount leaked (urine): none    Frequency of bowel movements: pt report IBS- sometimes does not make BM every day and at t imes several times dailt  Difficulty initiating BM: at times  Quality/Shape of BM: [place] Stool Chart 1-6  Colon leakage: yes  Frequency of incidents: 2x monthly?   Amount leaked (bowels): small amount  Form of protection: none  Number of pads required in 24 hours: none      Types of fluid intake: water and coffee  Diet:trying to incorporate more fiber in diet- typically does not eat breakfast and reports minimal water intake during the day  Current exercise:none    Occupation: retired- takes care of grandchildren frequently     OBJECTIVE     ORTHO SCREEN  Posture: WNL  Pelvic alignment: no sign of deviations noted in supine    ABDOMINALS  NT  Chaperone: refused  Consent signed: yes    VAGINAL PELVIC FLOOR EXAM    EXTERNAL ASSESSMENT  Introitus:gapping  Skin condition:WNL  Scarring: none  Sensation: WNL  Pain:  Q tip test positive at perineal body and bulbo B  Voluntary contraction: nil  Voluntary relaxation: bulge  Involuntary contraction: visible lift  Bearing down: visible drop  Perineal descent: present      INTERNAL ASSESSMENT     Muscle Power: 1/5 B  Muscle Endurance: 1 sec     Quality of contraction:   Specificity:uses other accessory mm such as abdominals and hip adductors to contract pelvic floor mm      Prolapse check:DNA  Comments: increased tone and moderate tenderness to R levator ani 3-6 o clock and 6-9    ISEMG EVALUATION: Deferred due to time constraints            Functional Limitations Reports - G Codes  Category:CJ  Tool: PFDI-20  Survey  Score: 25% Limitation   Current/ : CJ = at least 20% but < 40% impaired, limited or restricted  Goal/ : CI = at least 1% but < 20% impaired, limited or restricted       Modifier  Impairment Limitation Restriction    CH  0 % impaired, limited or restricted    CI  @ least 1% but less than 20% impaired, limited or restricted    CJ  @ least 20%<40% impaired, limited or restricted    CK  @ least 40%<60% impaired, limited or restricted    CL  @ least 60% <80% impaired, limited or restricted    CM  @ least 80%<100% impaired limited or restricted    CN  100% impaired, limited or restricted         TREATMENT      Therapeutic Exercise/HEP to develop  Increased strength for 5 minutes including: bearing down with abdominal release and diaphragmatic breathing      Manual Therapy to develop extensibility and desensitization for 5 minutes including: trigger point/myofascial release of levator ani mm- puborectalis           Modalities  NT      Education: instructed on general anatomy/physiology of urinary/bowel system; discussed plan of care with patient and parent/guardian; instructed in benefits/risks of treatment; instructed in alternative methods of treatment; instructed in risks of refusing treatment; patient a parent agreed to treatment plan.     Also educated in: anatomy/physiology of pelvic floor, bladder retraining, diaphragmatic breathing, double voiding techniques, kegels, proper bearing down techniques, fluid intake/dietary modifications and behavior modifications    ASSESSMENT      This is a 57 y.o. female referred to outpatient physical therapy and presents with a medical diagnosis of pelvic pain. Patient will benefit from skilled physical therapy to improve mobility and decrease pelvic pain    SEMG Problems: did not evaluate  No skin irritation, erythema, or other adverse effects observed from electrode placement.    Educational/Spiritual/Cultural needs: none  Abuse/Neglect: no signs  Nutritional  Status: WDWN   Fall Risk: none    Pt's spiritual, cultural and educational needs considered and pt agreeable to plan of care and goals as stated below:     Medical necessity is demonstrated by the following IMPAIRMENTS/PROMBLEMS     History  Co-morbidities and personal factors that may impact the plan of care Examination  Body Structures and Functions, activity limitations and participation restrictions that may impact the plan of care Clinical Presentation   Decision Making/ Complexity Score   Co-morbidities:                 Personal Factors:    Body Regions/Systems/Functions:    pelvic floor tenderness, perineal descent, decreased pelvic muscle strength, decreased endurance of the pelvic muscles, decreased phasic ability of the pelvic muscles, increased tension of the pelvic muscles, poor quality of pelvic muscle contraction, increased nocturia, poor fluid intake, poor diet, incomplete urination, dysfunctional voiding, dysfunctional defecation, unable to co-contract or co-relax abdominal wall and pelvic floor muscles and chronic UTI due to dysfunctional voiding           Activity limitations:   Barriers to Learning: none  Environmental Barriers: none noted    Participation Restrictions: standing for 20-30 minutes          low           PLAN    Frequency: 1x week  Duration: 6 weeks  Short Term Goals: 1-3 weeks   Pt will report improved ability to sleep uninterrupted by excessive nocturia 5/7 days per week.   Pt will urinate without crede/Valsalva to prevent adverse effects to adjacent structures.  Pt will be independent with double voiding techniques 100% of the time to ensure full bladder emptying and decrease pt's risk of infection.   Pt will bear down appropriately 80% of the time for more effective stooling and prevent adverse effects to adjacent structures.   Pt/family will be independent with HEP for continued self-management of symptoms.    Long Term Goals: 4-6 weeks   Pt will verbalize improved awareness of  PFM activity as palpated by PT in order to improve activity involvement with HEP.  Pt will report improved ability to tolerate standing > or = 20-45 min with < or = 2/10 pain for improved ability to complete walking and staning while playing with grandkids and shoppinf.  Pt will report improved ability to engage pelvic/abdominal brace with < or = 2/10 pain for improved tolerance of functional transfers/mobility.   Pt will report < or = 2/10 pain with urination/defecation 80% of the time.   Pt will report improved ability to tolerate standing > or = 20-45 min without vaginal pressure for improved ability to complete ADL/ IADL's.   Pt will demonstrate minimal increase in PFM activity on SEMG with bearing down for improved ability to evacuate bowels.  Pt/family with be independent with double voiding techniques.    Physical therapy will include: therapeutic exercises, neuromuscular re-education, manual therapy, modalities PRN, patient/family education and self care/home management      Therapist: Dana Bradley, PT  5/22/2018

## 2018-05-30 ENCOUNTER — CLINICAL SUPPORT (OUTPATIENT)
Dept: REHABILITATION | Facility: HOSPITAL | Age: 58
End: 2018-05-30
Payer: COMMERCIAL

## 2018-05-30 DIAGNOSIS — R39.15 URINARY URGENCY: ICD-10-CM

## 2018-05-30 DIAGNOSIS — N39.0 FREQUENT UTI: Primary | ICD-10-CM

## 2018-05-30 PROCEDURE — 97110 THERAPEUTIC EXERCISES: CPT | Performed by: PHYSICAL THERAPIST

## 2018-05-30 PROCEDURE — 97140 MANUAL THERAPY 1/> REGIONS: CPT | Performed by: PHYSICAL THERAPIST

## 2018-05-30 PROCEDURE — 97112 NEUROMUSCULAR REEDUCATION: CPT | Performed by: PHYSICAL THERAPIST

## 2018-05-31 NOTE — PROGRESS NOTES
OUTPATIENT PHYSICAL THERAPY DAILY NOTE       Patient: Lenora Lopez [facility] #:  [id number]    Diagnosis:   Encounter Diagnoses   Name Primary?    Frequent UTI Yes    Urinary urgency       Date of treatment: 05/31/2018       SUBJECTIVE   Pt reports: I have been drinking more water and trying to use bathroom more frequently. My right hip has really been bothering me especially when sitting for too long  Pain: 5/10 R hip      OBJECTIVE     Therapeutic Exercise to develop increased mm extensibility for 8 minutes including:   passive stretching of obturator internus and pirifomris 4 min each    Neuromuscular Re-education to develop multiple selection list (balance, coordination) for 8 minutes including:  Kegels with assist of SEMG    Manual Therapy to develop extensibility and desensitization for 8 minutes including: Obturator internus      Modalities MHP to R hip 15 min    Education: Discussed progression of plan of care with patient; educated pt in activity modification; reviewed HEP with pt. Pt demonstrated and verbalized understanding of all instruction and was provided with a handout of HEP (see Patient Instructions).      ASSESSMENT      Pt tolerated entire treatment. Trigger points located in obturator internus and increased R hip pain today  Will the patient continue to benefit from skilled PT intervention? yes        PLAN     Continue with established Plan of Care, working toward established PT goals.

## 2018-06-06 ENCOUNTER — CLINICAL SUPPORT (OUTPATIENT)
Dept: REHABILITATION | Facility: HOSPITAL | Age: 58
End: 2018-06-06
Payer: COMMERCIAL

## 2018-06-06 DIAGNOSIS — N39.0 FREQUENT UTI: Primary | ICD-10-CM

## 2018-06-06 DIAGNOSIS — R10.2 PELVIC PRESSURE IN FEMALE: ICD-10-CM

## 2018-06-06 DIAGNOSIS — R39.15 URINARY URGENCY: ICD-10-CM

## 2018-06-06 DIAGNOSIS — K58.2 IRRITABLE BOWEL SYNDROME WITH BOTH CONSTIPATION AND DIARRHEA: ICD-10-CM

## 2018-06-06 PROCEDURE — 97110 THERAPEUTIC EXERCISES: CPT | Performed by: PHYSICAL THERAPIST

## 2018-06-06 PROCEDURE — 97112 NEUROMUSCULAR REEDUCATION: CPT | Performed by: PHYSICAL THERAPIST

## 2018-06-06 PROCEDURE — 97140 MANUAL THERAPY 1/> REGIONS: CPT | Performed by: PHYSICAL THERAPIST

## 2018-06-06 NOTE — PROGRESS NOTES
OUTPATIENT PHYSICAL THERAPY DAILY NOTE       Patient: Lenora Lopez [facility] #:  [id number]    Diagnosis:   Encounter Diagnoses   Name Primary?    Frequent UTI Yes    Urinary urgency     Pelvic pressure in female     Irritable bowel syndrome with both constipation and diarrhea       Date of treatment: 06/06/2018       SUBJECTIVE   Pt reports: I have been having less hip pain  Pain: 2-3/10 R hip      OBJECTIVE     Therapeutic Exercise to develop increased mm extensibility for 8 minutes including:   passive stretching of obturator internus and pirifomris 4 min each    Neuromuscular Re-education to develop multiple selection list (balance, coordination) for 8 minutes including:  Kegels with assist of SEMG    Manual Therapy to develop extensibility and desensitization for 8 minutes including: Obturator internus and coccygeus      Modalities MHP to R hip 15 min    Education: Discussed progression of plan of care with patient; educated pt in activity modification; reviewed HEP with pt. Pt demonstrated and verbalized understanding of all instruction and was provided with a handout of HEP (see Patient Instructions).      ASSESSMENT      Pt tolerated entire treatment. Trigger points located in obturator internus and increased R hip pain today  Will the patient continue to benefit from skilled PT intervention? yes        PLAN     Continue with established Plan of Care, working toward established PT goals.

## 2018-06-07 ENCOUNTER — DOCUMENTATION ONLY (OUTPATIENT)
Dept: UROGYNECOLOGY | Facility: CLINIC | Age: 58
End: 2018-06-07

## 2018-06-07 ENCOUNTER — TELEPHONE (OUTPATIENT)
Dept: UROGYNECOLOGY | Facility: CLINIC | Age: 58
End: 2018-06-07

## 2018-06-07 ENCOUNTER — LAB VISIT (OUTPATIENT)
Dept: LAB | Facility: HOSPITAL | Age: 58
End: 2018-06-07
Attending: OBSTETRICS & GYNECOLOGY
Payer: COMMERCIAL

## 2018-06-07 DIAGNOSIS — R35.0 URINARY FREQUENCY: ICD-10-CM

## 2018-06-07 DIAGNOSIS — R30.0 DYSURIA: ICD-10-CM

## 2018-06-07 DIAGNOSIS — R39.15 URGENCY OF MICTURITION: Primary | ICD-10-CM

## 2018-06-07 DIAGNOSIS — R39.15 URGENCY OF MICTURITION: ICD-10-CM

## 2018-06-07 DIAGNOSIS — N39.0 ACUTE UTI: Primary | ICD-10-CM

## 2018-06-07 LAB
BILIRUB UR QL STRIP: NEGATIVE
CLARITY UR: CLEAR
COLOR UR: YELLOW
GLUCOSE UR QL STRIP: NEGATIVE
HGB UR QL STRIP: ABNORMAL
KETONES UR QL STRIP: NEGATIVE
LEUKOCYTE ESTERASE UR QL STRIP: ABNORMAL
MICROSCOPIC COMMENT: NORMAL
NITRITE UR QL STRIP: NEGATIVE
PH UR STRIP: 6 [PH] (ref 5–8)
PROT UR QL STRIP: NEGATIVE
RBC #/AREA URNS HPF: 0 /HPF (ref 0–4)
SP GR UR STRIP: 1.01 (ref 1–1.03)
URN SPEC COLLECT METH UR: ABNORMAL
UROBILINOGEN UR STRIP-ACNC: NEGATIVE EU/DL
WBC #/AREA URNS HPF: 4 /HPF (ref 0–5)

## 2018-06-07 PROCEDURE — 81000 URINALYSIS NONAUTO W/SCOPE: CPT

## 2018-06-07 PROCEDURE — 87086 URINE CULTURE/COLONY COUNT: CPT

## 2018-06-07 RX ORDER — SULFAMETHOXAZOLE AND TRIMETHOPRIM 800; 160 MG/1; MG/1
1 TABLET ORAL 2 TIMES DAILY
Qty: 10 TABLET | Refills: 0 | Status: SHIPPED | OUTPATIENT
Start: 2018-06-07 | End: 2018-06-12

## 2018-06-07 NOTE — TELEPHONE ENCOUNTER
Spoke to pt, she was informed that her RX was sent to [facility] in [place], pt aware and verbalizes understanding.

## 2018-06-07 NOTE — TELEPHONE ENCOUNTER
----- Message from Cass Simons sent at 6/7/2018  9:20 AM CDT -----  Contact: self  Pt returned your call and yes she would like something called in to pharmacy for her UTI.  Call back number is

## 2018-06-07 NOTE — TELEPHONE ENCOUNTER
Verified with pt that she does want an Rx sent to her pharmacy after dropping off her specimen. Pt was informed, when her C&S results come in, Dr. Keyes may have to send another Rx to cover her UTI. Pt voiced understanding and call ended.

## 2018-06-07 NOTE — TELEPHONE ENCOUNTER
Spoke to pt, she states she dropped off urine specimen this a.m- and RX was not called in to her pharmacy: [facility] in [place]

## 2018-06-07 NOTE — TELEPHONE ENCOUNTER
1)  Chief complaint:  UTI, urgency, frequency, dysuria    Onset of issue: 3 days  Duration of issue: 3 days    Burning with urination  Yes -   Urgency/frequency: Yes - urinating every hour  Temp > 100.4 F: No  Chills:  No  Nausea/vomiting: No  Visible blood in urine:  Yes - red when whiping  Lower abdominal/flank/upper back pain:  No  Other:  No     **IF PATIENT SEEMS VERY ILL AFTER THESE QUESTIONS, STOP HERE AND PLEASE NOTIFY MD OR NP AND DIRECT PATIENT TO NEAREST ER OR URGENT CARE.  IF NOT, CONTINUE AS BELOW:      2)  Are you currently taking any medications (prescription or over the counter) for your symptoms?  No  --name of medication(s):  N/A    We would like to obtain a urine sample for culture to test for infection.  If you feel like you are able to, which [facility] location would you be able to drop off specimen: [facility]    If you are so ill that you cannot drop off specimen, please go directly to nearest ER.      Once you drop off the specimen, we will send a prescription for antibiotics that you can start immediately once you have dropped off the specimen.    Pharmacy information:  [facility] (pharmacy listed in [facility])    We will call you once the culture is back, which can take several days.  In the meantime, if you start to have any increase in pain with urination, urgency/frequency, T > 100.4 F, visible blood in urine, lower abdominal/flank/upper back pain, nausea/vomiting, or any other concerning symptoms related to your bladder issue, please call us immediately from 830 am - 430 pm M-F or go to your nearest ER after hours/on weekends.     ----------------------------------------------------------------------------------------------------------------------  PLEASE FILL OUT THE FOLLOWING & CC RESPONSIBLE PARTY:  --please make sure all orders are placed so that they can be processed without waiting for MD/NP to immediately sign off (i.e. The responsible party may not get to sign  orders until much later in day and we need to place orders for labs and meds so that they can be processed immediately and signed by MD/NP later)    Orders placed in EPIC for UA, urine C&S:  Yes - UA/UC   Medication(s) sent pharmacy:  None until specimen is dropped off  Modality (how were orders sent?):  ERx, phoned in per MD, phoned in per MA/nurse,   Orders put in EPIC.

## 2018-06-07 NOTE — TELEPHONE ENCOUNTER
Left voice message for pt to give the office a call back at [phone]. Called to see if pt needed anything called in for a UTI.

## 2018-06-07 NOTE — TELEPHONE ENCOUNTER
----- Message from Park Levin sent at 6/7/2018  1:43 PM CDT -----  Contact: self  Pt needing a call back, regarding her Rx, she can be reached at [phone].

## 2018-06-07 NOTE — TELEPHONE ENCOUNTER
----- Message from Jennifer Nicholson sent at 6/7/2018  7:36 AM CDT -----  Contact: Self  Pt is calling because she has a UTI.  Pt says she saw blood in her urine.    She can be reached at [phone].    Thank you.

## 2018-06-08 LAB — BACTERIA UR CULT: NORMAL

## 2018-06-09 ENCOUNTER — TELEPHONE (OUTPATIENT)
Dept: UROGYNECOLOGY | Facility: CLINIC | Age: 58
End: 2018-06-09

## 2018-06-09 DIAGNOSIS — R30.0 DYSURIA: Primary | ICD-10-CM

## 2018-06-09 RX ORDER — OXYBUTYNIN CHLORIDE 5 MG/1
TABLET ORAL
Qty: 30 TABLET | Refills: 11 | Status: SHIPPED | OUTPATIENT
Start: 2018-06-09 | End: 2020-01-30

## 2018-06-09 NOTE — TELEPHONE ENCOUNTER
Please let patient know urine C&S was negative for infection.  She should still finish antibiotics, though, so as not to create resistance.      Next time, she feels like she has a UTI, unless there's jericho blood in her urine, I would like her to try an antispasm medication for a few days 1st, to see if bladder spasms may be causing her symptoms, at least sometimes, instead of infection.  I sent in Rx for ditropan 5 mg.  Next time, she has symptoms, unless she's seeing blood, can she try to take 1-2 pills at a time  up to 3 times/day x 2 days.  If symptoms still persist after that, she should call us.  She should continue to use vaginal estrogen and work on better bowel movement control, as we discussed, too.      Thanks!

## 2018-06-11 ENCOUNTER — TELEPHONE (OUTPATIENT)
Dept: UROGYNECOLOGY | Facility: CLINIC | Age: 58
End: 2018-06-11

## 2018-06-11 NOTE — TELEPHONE ENCOUNTER
Spoke to pt, she was informed that her urine C&S was negative for infection.  She should still finish antibiotics, though, so as not to create resistance.       Pt was also informed the next time she feels like she has a UTI, unless there's jericho blood in her urine, Dr. Keyes  would like her to try an antispasm medication for a few days 1st, to see if bladder spasms may be causing her symptoms, at least sometimes, instead of infection. She was informed that Dr. Keyes sent in an Rx for ditropan 5 mg. Next time, she has symptoms, unless she's seeing blood, can she try to take 1-2 pills at a time  up to 3 times/day x 2 days.  If symptoms still persist after that, she should call us.  She should continue to use vaginal estrogen and work on better bowel movement control. Pt verbalizes understanding.

## 2018-06-11 NOTE — TELEPHONE ENCOUNTER
Attempted to contact pt to inform her that her urine C&S was negative for infection.  She should still finish antibiotics, though, so as not to create resistance.       And to also inform her that next time, she feels like she has a UTI, unless there's jericho blood in her urine, Dr. Keyes  would like her to try an antispasm medication for a few days 1st, to see if bladder spasms may be causing her symptoms, at least sometimes, instead of infection.  Dr. Keyes sent in Rx for ditropan 5 mg. Next time, she has symptoms, unless she's seeing blood, can she try to take 1-2 pills at a time  up to 3 times/day x 2 days.  If symptoms still persist after that, she should call us.  She should continue to use vaginal estrogen and work on better bowel movement control. LM to call office.

## 2018-06-11 NOTE — TELEPHONE ENCOUNTER
----- Message from Guero Galloway sent at 6/11/2018  2:40 PM CDT -----  Pt is returning Hermann call [phone]

## 2018-06-11 NOTE — TELEPHONE ENCOUNTER
Spoke to pt, she was informed that her urine C&S was negative for infection.  She should still finish antibiotics, though, so as not to create resistance.       Pt was also informed the next time she feels like she has a UTI, unless there's jericho blood in her urine, Dr. Keyes  would like her to try an antispasm medication for a few days 1st, to see if bladder spasms may be causing her symptoms, at least sometimes, instead of infection. She was informed that Dr. Keyes sent in an Rx for ditropan 5 mg. Next time, she has symptoms, unless she's seeing blood, can she try to take 1-2 pills at a time  up to 3 times/day x 2 days.  If symptoms still persist after that, she should call us.  She should continue to use vaginal estrogen and work on better bowel movement control. Pt verbalizes understanding

## 2018-06-12 ENCOUNTER — OFFICE VISIT (OUTPATIENT)
Dept: OPHTHALMOLOGY | Facility: CLINIC | Age: 58
End: 2018-06-12
Payer: COMMERCIAL

## 2018-06-12 DIAGNOSIS — Z13.5 GLAUCOMA SCREENING: ICD-10-CM

## 2018-06-12 DIAGNOSIS — Z01.00 ENCOUNTER FOR EXAMINATION OF EYES AND VISION WITHOUT ABNORMAL FINDINGS: Primary | ICD-10-CM

## 2018-06-12 DIAGNOSIS — H52.4 PRESBYOPIA: ICD-10-CM

## 2018-06-12 PROCEDURE — 99999 PR PBB SHADOW E&M-EST. PATIENT-LVL I: CPT | Mod: PBBFAC,,, | Performed by: OPTOMETRIST

## 2018-06-12 PROCEDURE — 92004 COMPRE OPH EXAM NEW PT 1/>: CPT | Mod: S$GLB,,, | Performed by: OPTOMETRIST

## 2018-06-12 PROCEDURE — 92015 DETERMINE REFRACTIVE STATE: CPT | Mod: S$GLB,,, | Performed by: OPTOMETRIST

## 2018-06-12 PROCEDURE — 99211 OFF/OP EST MAY X REQ PHY/QHP: CPT | Mod: PBBFAC,PO | Performed by: OPTOMETRIST

## 2018-06-22 ENCOUNTER — CLINICAL SUPPORT (OUTPATIENT)
Dept: REHABILITATION | Facility: HOSPITAL | Age: 58
End: 2018-06-22
Payer: COMMERCIAL

## 2018-06-22 DIAGNOSIS — N39.0 FREQUENT UTI: Primary | ICD-10-CM

## 2018-06-22 DIAGNOSIS — K58.2 IRRITABLE BOWEL SYNDROME WITH BOTH CONSTIPATION AND DIARRHEA: ICD-10-CM

## 2018-06-22 DIAGNOSIS — R39.15 URINARY URGENCY: ICD-10-CM

## 2018-06-22 DIAGNOSIS — R10.2 PELVIC PRESSURE IN FEMALE: ICD-10-CM

## 2018-06-22 PROCEDURE — 97112 NEUROMUSCULAR REEDUCATION: CPT | Performed by: PHYSICAL THERAPIST

## 2018-06-22 PROCEDURE — 97110 THERAPEUTIC EXERCISES: CPT | Performed by: PHYSICAL THERAPIST

## 2018-06-22 NOTE — PROGRESS NOTES
OUTPATIENT PHYSICAL THERAPY DAILY NOTE       Patient: Lenora Lopez [facility] #:  [id number]    Diagnosis:   Encounter Diagnoses   Name Primary?    Frequent UTI Yes    Urinary urgency     Pelvic pressure in female     Irritable bowel syndrome with both constipation and diarrhea       Date of treatment: 06/22/2018       SUBJECTIVE   Pt reports: I have no more hip pain since last visit. Also, I have less pressure in abdomen and pelvic region when standing and sitting. I do exercises sometimes but not as often as I should      OBJECTIVE     Therapeutic Exercise 10 min  Kegels with assist of SEMG, bearing down with abdominal release and diaphragmatic breathing    Neuromuscular Re-education to develop increased strength and endurance in pelvic mm force field 25 min  Kegels with assist of SEMG and relaxation with NM estim    Manual Therapy NT    Education: Discussed progression of plan of care with patient; educated pt in activity modification; reviewed HEP with pt. Pt demonstrated and verbalized understanding of all instruction and was not provided with a handout of HEP (see Patient Instructions).      ASSESSMENT      Pt demo increase pelvic contraction strength from 10 MV to 15 after estim and relaxtaion from 5MV to 2-3 MV    PLAN     Continue with established Plan of Care, working toward established PT goals.

## 2018-06-22 NOTE — PROGRESS NOTES
OUTPATIENT PHYSICAL THERAPY EVALUATION        Patient: Lenora Lopez [facility] #:  [id number]    Date of treatment: 06/22/2018   # Visits: 1/20      HISTORY      Lenora is a 57 y.o. female evaluated on 06/22/2018. Pt presents to PT with complaints of vaginal and abdominal pain and pressure especially when standing or constipated. Pt also complains of low back pain and R hip pain due to OA    Physician:  Umm Keyes MD   Diagnosis:   No diagnosis found.   Treatment ordered: Physical Therapy  Medical History: No past medical history on file.   Surgical History:   Past Surgical History:   Procedure Laterality Date    APPENDECTOMY        Medications:   Current Outpatient Prescriptions   Medication Sig    cefdinir (OMNICEF) 300 MG capsule Take 300 mg by mouth 2 (two) times daily.    conjugated estrogens (PREMARIN) vaginal cream 0.5 g with applicator or dime-sized amt with finger in vagina nightly x 2 weeks, then twice a week thereafter    naproxen (NAPROSYN) 500 MG tablet Take 1 tablet (500 mg total) by mouth 2 (two) times daily with meals.    oxybutynin (DITROPAN) 5 MG Tab 1-2 tablets (5-10 mg) PO up to 3 times/day for bladder flares     No current facility-administered medications for this visit.        Allergies:   Review of patient's allergies indicates:   Allergen Reactions    Clindamycin Rash        Precautions: none    OB/GYN History:  childbirth vaginal delivery, episiotomy, vaginal dryness, menopause and pelvic pain. Vaginal delivery 30 years ago left rectal tearing. Frequent UTI    Bladder/Bowel History: Pt reports IBS- bouts of diarrhea and constipation      SUBJECTIVE     Date of onset: gradually began and worsening over 2 years  Patient's goals for therapy: stand and walk without pressure and pain in vagina  Pain: Pt denies pain- just uncomfortable    Activities that cause symptoms: strong urge to go, prolonged standing and defecating    Previous treatment included none    Sexually active  no    Frequency of urination:   Daytime: every 8 hours           Nighttime: 2x    Difficulty initiating urine stream: no  Urine stream: weak and splayed  Complete emptying: no- needs to double void by leaning forward on toilet  Bladder leakage: none  Frequency of incidents:none  Amount leaked (urine): none    Frequency of bowel movements: pt report IBS- sometimes does not make BM every day and at t imes several times dailt  Difficulty initiating BM: at times  Quality/Shape of BM: [place] Stool Chart 1-6  Colon leakage: yes  Frequency of incidents: 2x monthly?   Amount leaked (bowels): small amount  Form of protection: none  Number of pads required in 24 hours: none      Types of fluid intake: water and coffee  Diet:trying to incorporate more fiber in diet- typically does not eat breakfast and reports minimal water intake during the day  Current exercise:none    Occupation: retired- takes care of grandchildren frequently     OBJECTIVE     ORTHO SCREEN  Posture: WNL  Pelvic alignment: no sign of deviations noted in supine    ABDOMINALS  NT  Chaperone: refused  Consent signed: yes    VAGINAL PELVIC FLOOR EXAM    EXTERNAL ASSESSMENT  Introitus:gapping  Skin condition:WNL  Scarring: none  Sensation: WNL  Pain:  Q tip test positive at perineal body and bulbo B  Voluntary contraction: nil  Voluntary relaxation: bulge  Involuntary contraction: visible lift  Bearing down: visible drop  Perineal descent: present      INTERNAL ASSESSMENT     Muscle Power: 1/5 B  Muscle Endurance: 1 sec     Quality of contraction:   Specificity:uses other accessory mm such as abdominals and hip adductors to contract pelvic floor mm      Prolapse check:DNA  Comments: increased tone and moderate tenderness to R levator ani 3-6 o clock and 6-9    ISEMG EVALUATION: Deferred due to time constraints            Functional Limitations Reports - G Codes  Category:CJ  Tool: PFDI-20 Survey  Score: 25% Limitation   Current/ : CJ = at least 20% but <  40% impaired, limited or restricted  Goal/ : CI = at least 1% but < 20% impaired, limited or restricted       Modifier  Impairment Limitation Restriction    CH  0 % impaired, limited or restricted    CI  @ least 1% but less than 20% impaired, limited or restricted    CJ  @ least 20%<40% impaired, limited or restricted    CK  @ least 40%<60% impaired, limited or restricted    CL  @ least 60% <80% impaired, limited or restricted    CM  @ least 80%<100% impaired limited or restricted    CN  100% impaired, limited or restricted         TREATMENT      Therapeutic Exercise/HEP to develop  Increased strength for 5 minutes including: bearing down with abdominal release and diaphragmatic breathing      Manual Therapy to develop extensibility and desensitization for 5 minutes including: trigger point/myofascial release of levator ani mm- puborectalis           Modalities  NT      Education: instructed on general anatomy/physiology of urinary/bowel system; discussed plan of care with patient and parent/guardian; instructed in benefits/risks of treatment; instructed in alternative methods of treatment; instructed in risks of refusing treatment; patient a parent agreed to treatment plan.     Also educated in: anatomy/physiology of pelvic floor, bladder retraining, diaphragmatic breathing, double voiding techniques, kegels, proper bearing down techniques, fluid intake/dietary modifications and behavior modifications    ASSESSMENT      This is a 57 y.o. female referred to outpatient physical therapy and presents with a medical diagnosis of pelvic pain. Patient will benefit from skilled physical therapy to improve mobility and decrease pelvic pain    SEMG Problems: did not evaluate  No skin irritation, erythema, or other adverse effects observed from electrode placement.    Educational/Spiritual/Cultural needs: none  Abuse/Neglect: no signs  Nutritional Status: WDWN   Fall Risk: none    Pt's spiritual, cultural and educational  needs considered and pt agreeable to plan of care and goals as stated below:     Medical necessity is demonstrated by the following IMPAIRMENTS/PROMBLEMS     History  Co-morbidities and personal factors that may impact the plan of care Examination  Body Structures and Functions, activity limitations and participation restrictions that may impact the plan of care Clinical Presentation   Decision Making/ Complexity Score   Co-morbidities:                 Personal Factors:    Body Regions/Systems/Functions:    pelvic floor tenderness, perineal descent, decreased pelvic muscle strength, decreased endurance of the pelvic muscles, decreased phasic ability of the pelvic muscles, increased tension of the pelvic muscles, poor quality of pelvic muscle contraction, increased nocturia, poor fluid intake, poor diet, incomplete urination, dysfunctional voiding, dysfunctional defecation, unable to co-contract or co-relax abdominal wall and pelvic floor muscles and chronic UTI due to dysfunctional voiding           Activity limitations:   Barriers to Learning: none  Environmental Barriers: none noted    Participation Restrictions: standing for 20-30 minutes          low           PLAN    Frequency: 1x week  Duration: 6 weeks  Short Term Goals: 1-3 weeks   Pt will report improved ability to sleep uninterrupted by excessive nocturia 5/7 days per week.   Pt will urinate without crede/Valsalva to prevent adverse effects to adjacent structures.  Pt will be independent with double voiding techniques 100% of the time to ensure full bladder emptying and decrease pt's risk of infection.   Pt will bear down appropriately 80% of the time for more effective stooling and prevent adverse effects to adjacent structures.   Pt/family will be independent with HEP for continued self-management of symptoms.    Long Term Goals: 4-6 weeks   Pt will verbalize improved awareness of PFM activity as palpated by PT in order to improve activity involvement  with HEP.  Pt will report improved ability to tolerate standing > or = 20-45 min with < or = 2/10 pain for improved ability to complete walking and staning while playing with grandkids and shoppinf.  Pt will report improved ability to engage pelvic/abdominal brace with < or = 2/10 pain for improved tolerance of functional transfers/mobility.   Pt will report < or = 2/10 pain with urination/defecation 80% of the time.   Pt will report improved ability to tolerate standing > or = 20-45 min without vaginal pressure for improved ability to complete ADL/ IADL's.   Pt will demonstrate minimal increase in PFM activity on SEMG with bearing down for improved ability to evacuate bowels.  Pt/family with be independent with double voiding techniques.    Physical therapy will include: therapeutic exercises, neuromuscular re-education, manual therapy, modalities PRN, patient/family education and self care/home management      Therapist: Dana Bradley, PT  6/22/2018      OUTPATIENT PHYSICAL THERAPY DAILY NOTE       Patient: Lenora Lopez [facility] #:  [id number]    Diagnosis:   No diagnosis found.   Date of treatment: 06/22/2018       SUBJECTIVE   Pt reports: I have been having less hip pain  Pain: 2-3/10 R hip      OBJECTIVE     Therapeutic Exercise to develop increased mm extensibility for 8 minutes including:   passive stretching of obturator internus and pirifomris 4 min each    Neuromuscular Re-education to develop multiple selection list (balance, coordination) for 8 minutes including:  Kegels with assist of SEMG    Manual Therapy to develop extensibility and desensitization for 8 minutes including: Obturator internus and coccygeus      Modalities MHP to R hip 15 min    Education: Discussed progression of plan of care with patient; educated pt in activity modification; reviewed HEP with pt. Pt demonstrated and verbalized understanding of all instruction and was provided with a handout of HEP (see Patient  Instructions).      ASSESSMENT      Pt tolerated entire treatment. Trigger points located in obturator internus and increased R hip pain today  Will the patient continue to benefit from skilled PT intervention? yes        PLAN     Continue with established Plan of Care, working toward established PT goals.

## 2018-06-26 ENCOUNTER — OFFICE VISIT (OUTPATIENT)
Dept: ENDOCRINOLOGY | Facility: CLINIC | Age: 58
End: 2018-06-26
Payer: COMMERCIAL

## 2018-06-26 ENCOUNTER — LAB VISIT (OUTPATIENT)
Dept: LAB | Facility: HOSPITAL | Age: 58
End: 2018-06-26
Attending: INTERNAL MEDICINE
Payer: COMMERCIAL

## 2018-06-26 VITALS
WEIGHT: 208.88 LBS | HEART RATE: 73 BPM | DIASTOLIC BLOOD PRESSURE: 78 MMHG | HEIGHT: 64 IN | SYSTOLIC BLOOD PRESSURE: 118 MMHG | BODY MASS INDEX: 35.66 KG/M2

## 2018-06-26 DIAGNOSIS — E04.1 THYROID NODULE: ICD-10-CM

## 2018-06-26 DIAGNOSIS — R53.83 OTHER FATIGUE: ICD-10-CM

## 2018-06-26 LAB
25(OH)D3+25(OH)D2 SERPL-MCNC: 23 NG/ML
ALBUMIN SERPL BCP-MCNC: 4.1 G/DL
ALP SERPL-CCNC: 78 U/L
ALT SERPL W/O P-5'-P-CCNC: 35 U/L
ANION GAP SERPL CALC-SCNC: 9 MMOL/L
AST SERPL-CCNC: 26 U/L
BASOPHILS # BLD AUTO: 0.04 K/UL
BASOPHILS NFR BLD: 0.8 %
BILIRUB SERPL-MCNC: 0.6 MG/DL
BUN SERPL-MCNC: 11 MG/DL
CALCIUM SERPL-MCNC: 9.3 MG/DL
CHLORIDE SERPL-SCNC: 105 MMOL/L
CHOLEST SERPL-MCNC: 268 MG/DL
CHOLEST/HDLC SERPL: 5.7 {RATIO}
CO2 SERPL-SCNC: 27 MMOL/L
CREAT SERPL-MCNC: 0.8 MG/DL
DIFFERENTIAL METHOD: NORMAL
EOSINOPHIL # BLD AUTO: 0.1 K/UL
EOSINOPHIL NFR BLD: 2.1 %
ERYTHROCYTE [DISTWIDTH] IN BLOOD BY AUTOMATED COUNT: 12.8 %
EST. GFR  (AFRICAN AMERICAN): >60 ML/MIN/1.73 M^2
EST. GFR  (NON AFRICAN AMERICAN): >60 ML/MIN/1.73 M^2
ESTIMATED AVG GLUCOSE: 123 MG/DL
GLUCOSE SERPL-MCNC: 104 MG/DL
HBA1C MFR BLD HPLC: 5.9 %
HCT VFR BLD AUTO: 43.4 %
HDLC SERPL-MCNC: 47 MG/DL
HDLC SERPL: 17.5 %
HGB BLD-MCNC: 13.9 G/DL
IMM GRANULOCYTES # BLD AUTO: 0.02 K/UL
IMM GRANULOCYTES NFR BLD AUTO: 0.4 %
LDLC SERPL CALC-MCNC: 195.8 MG/DL
LYMPHOCYTES # BLD AUTO: 1.8 K/UL
LYMPHOCYTES NFR BLD: 38 %
MCH RBC QN AUTO: 28.8 PG
MCHC RBC AUTO-ENTMCNC: 32 G/DL
MCV RBC AUTO: 90 FL
MONOCYTES # BLD AUTO: 0.4 K/UL
MONOCYTES NFR BLD: 8.9 %
NEUTROPHILS # BLD AUTO: 2.4 K/UL
NEUTROPHILS NFR BLD: 49.8 %
NONHDLC SERPL-MCNC: 221 MG/DL
NRBC BLD-RTO: 0 /100 WBC
PLATELET # BLD AUTO: 257 K/UL
PMV BLD AUTO: 10.3 FL
POTASSIUM SERPL-SCNC: 4 MMOL/L
PROT SERPL-MCNC: 7.8 G/DL
RBC # BLD AUTO: 4.82 M/UL
SODIUM SERPL-SCNC: 141 MMOL/L
TRIGL SERPL-MCNC: 126 MG/DL
TSH SERPL DL<=0.005 MIU/L-ACNC: 2.08 UIU/ML
VIT B12 SERPL-MCNC: 625 PG/ML
WBC # BLD AUTO: 4.84 K/UL

## 2018-06-26 PROCEDURE — 82306 VITAMIN D 25 HYDROXY: CPT

## 2018-06-26 PROCEDURE — 99204 OFFICE O/P NEW MOD 45 MIN: CPT | Mod: S$GLB,,, | Performed by: INTERNAL MEDICINE

## 2018-06-26 PROCEDURE — 80053 COMPREHEN METABOLIC PANEL: CPT

## 2018-06-26 PROCEDURE — 99999 PR PBB SHADOW E&M-EST. PATIENT-LVL III: CPT | Mod: PBBFAC,,, | Performed by: INTERNAL MEDICINE

## 2018-06-26 PROCEDURE — 85025 COMPLETE CBC W/AUTO DIFF WBC: CPT

## 2018-06-26 PROCEDURE — 83036 HEMOGLOBIN GLYCOSYLATED A1C: CPT

## 2018-06-26 PROCEDURE — 82607 VITAMIN B-12: CPT

## 2018-06-26 PROCEDURE — 84443 ASSAY THYROID STIM HORMONE: CPT

## 2018-06-26 PROCEDURE — 99213 OFFICE O/P EST LOW 20 MIN: CPT | Mod: PBBFAC | Performed by: INTERNAL MEDICINE

## 2018-06-26 PROCEDURE — 36415 COLL VENOUS BLD VENIPUNCTURE: CPT

## 2018-06-26 PROCEDURE — 80061 LIPID PANEL: CPT

## 2018-06-26 NOTE — ASSESSMENT & PLAN NOTE
Recheck us and labs        Discussed indications for a FNA     Discussed surgical indications (abnormal FNA, compressive symptoms or interval change)

## 2018-06-26 NOTE — PROGRESS NOTES
"Subjective:      Patient ID: Lenora Santiago is a 57 y.o. female.    Chief Complaint:  Thyroid Problem (New patient )      History of Present Illness  With regards to the thyroid nodule:  The thyroid nodule was found on exam   Or  imaging   It was confirmed on thyroid u/s done:     In care everywhere ... Per note found 2012     6/9/16   The thyroid is normal in size, echotexture, and vascularity. There is an 8 mm solid and cystic nodule upper pole right thyroid. There is an 8 mm solid and cystic nodule upper pole left thyroid. No evidence of microcalcifications.  TSH was nl       fna done: none      No difficulty breathing swallowing   No voice changes  No FH of thyroid cancer  No personal history of radiation treatment or exposure     No signs or symptoms of overt hyper or hypothyroidism    No weight changes  No bowel changes  No heat or cold intolerance   No hair nail or skin changes  No cp, or sob  occasional palpations     It is also noted that she has "Hypothyroidism (acquired) 9/16/2014 stopped supplement 2015 and euthyroid 2016 "  Per her it was to target her low energy     She c/o fatigue   She does not feel refreshed when she wakes  She does not know if she snores   Her  has chris     Does get s/s hypoglycemia - has not required assistance   Her daughter is a nurse and she checked it -- bg was in the 60s  It was "between meals"  She does not feel better when she eats-- sometimes she feels worse   Her diet is "bad" she likes carbs and sugar       With regards to obesity, BMI 35   Denies symptoms of hyperglycemia such as polyuria, polydipsia, nocturia, unexplained weight loss or blurred vision        Review of Systems   Constitutional: Negative for unexpected weight change.   Eyes: Negative for visual disturbance.   Respiratory: Negative for shortness of breath.    Cardiovascular: Negative for chest pain.   Gastrointestinal: Negative for abdominal pain.   Musculoskeletal: Negative for arthralgias. "   Skin: Negative for wound.   Neurological: Negative for headaches.   Hematological: Does not bruise/bleed easily.   Psychiatric/Behavioral: Negative for sleep disturbance.       Objective:   Physical Exam   Constitutional: She appears well-developed.   HENT:   Right Ear: External ear normal.   Left Ear: External ear normal.   Nose: Nose normal.   Hearing normal  Dentition good    Neck: No tracheal deviation present. No thyromegaly present.   Cardiovascular: Normal rate.    No murmur heard.  Pulmonary/Chest: Effort normal and breath sounds normal.   Abdominal: Soft. There is no tenderness. No hernia.   Musculoskeletal: She exhibits no edema.   Gait normal  No clubbing or cyanosis noted   Neurological: She displays normal reflexes. No cranial nerve deficit.   Skin: No rash noted.   No nodules       Psychiatric: She has a normal mood and affect. Judgment normal.   Vitals reviewed.  no acanthosis or skin tags  Slight supraclavicular fulness  Pale thin striae  Normal proximal muscle strength      Body mass index is 35.86 kg/m².    Lab Review:   No results found for: HGBA1C  No results found for: CHOL, HDL, LDLCALC, TRIG, CHOLHDL  Lab Results   Component Value Date     01/24/2017    K 4.2 01/24/2017     01/24/2017    CO2 26 01/24/2017     (H) 01/24/2017    BUN 8 01/24/2017    CREATININE 0.9 01/24/2017    CALCIUM 9.5 01/24/2017    PROT 8.2 01/24/2017    ALBUMIN 4.1 01/24/2017    BILITOT 1.3 (H) 01/24/2017    ALKPHOS 91 01/24/2017    AST 17 01/24/2017    ALT 19 01/24/2017    ANIONGAP 11 01/24/2017    ESTGFRAFRICA >60 01/24/2017    EGFRNONAA >60 01/24/2017       Assessment and Plan     Thyroid nodule  Recheck us and labs        Discussed indications for a FNA     Discussed surgical indications (abnormal FNA, compressive symptoms or interval change)          Other fatigue  Basic labs   Refer for chris denice     Does not fulfill whipples triad   Keep food diary limit simple carbs   Send me log if hypoglycemic  symptoms happen again     BMI 35 - check hba1c

## 2018-06-26 NOTE — ASSESSMENT & PLAN NOTE
Basic labs   Refer for chris eval     Does not fulfill whipples triad   Keep food diary limit simple carbs   Send me log if hypoglycemic symptoms happen again

## 2018-07-02 ENCOUNTER — CLINICAL SUPPORT (OUTPATIENT)
Dept: REHABILITATION | Facility: HOSPITAL | Age: 58
End: 2018-07-02
Payer: COMMERCIAL

## 2018-07-02 DIAGNOSIS — K58.2 IRRITABLE BOWEL SYNDROME WITH BOTH CONSTIPATION AND DIARRHEA: ICD-10-CM

## 2018-07-02 DIAGNOSIS — N39.0 FREQUENT UTI: Primary | ICD-10-CM

## 2018-07-02 DIAGNOSIS — R10.2 PELVIC PRESSURE IN FEMALE: ICD-10-CM

## 2018-07-02 DIAGNOSIS — R39.15 URINARY URGENCY: ICD-10-CM

## 2018-07-02 PROCEDURE — 97110 THERAPEUTIC EXERCISES: CPT | Performed by: PHYSICAL THERAPIST

## 2018-07-02 PROCEDURE — 97112 NEUROMUSCULAR REEDUCATION: CPT | Performed by: PHYSICAL THERAPIST

## 2018-07-02 NOTE — PROGRESS NOTES
OUTPATIENT PHYSICAL THERAPY DAILY NOTE       Patient: Lenora Lopez [facility] #:  [id number]    Diagnosis:   Encounter Diagnoses   Name Primary?    Frequent UTI Yes    Urinary urgency     Pelvic pressure in female     Irritable bowel syndrome with both constipation and diarrhea       Date of treatment: 07/02/2018       SUBJECTIVE   Pt reports: I have noomplaints! I have no more hip pain, pelvic pain, abdominal pain. I also can stand without abdominal pain!      OBJECTIVE     Therapeutic Exercise 10 min  Kegels with assist of SEMG, bearing down with abdominal release and diaphragmatic breathing    Neuromuscular Re-education to develop increased strength and endurance in pelvic mm force field 25 min  Kegels with assist of SEMG and relaxation with NM estim    Manual Therapy NT    Education: Discussed progression of plan of care with patient; educated pt in activity modification; reviewed HEP with pt. Pt demonstrated and verbalized understanding of all instruction and was not provided with a handout of HEP (see Patient Instructions).      ASSESSMENT      Pt has met all STG and LTG and will be d/c with HEP consisting of kegel quick flicks and holds for maintenance .     PLAN     D/C       Thank you for the referral!!    Dana Bradley, PT, DPT, MTC, OCS  Board certified Orthopaedic Specialist  Pelvic   Department of Physical Therapy  [phone]

## 2018-07-19 ENCOUNTER — OFFICE VISIT (OUTPATIENT)
Dept: UROGYNECOLOGY | Facility: CLINIC | Age: 58
End: 2018-07-19
Payer: COMMERCIAL

## 2018-07-19 VITALS
BODY MASS INDEX: 36.17 KG/M2 | WEIGHT: 211.88 LBS | SYSTOLIC BLOOD PRESSURE: 122 MMHG | DIASTOLIC BLOOD PRESSURE: 72 MMHG | HEIGHT: 64 IN

## 2018-07-19 DIAGNOSIS — N39.43 POST-VOID DRIBBLING: ICD-10-CM

## 2018-07-19 DIAGNOSIS — K58.2 IRRITABLE BOWEL SYNDROME WITH BOTH CONSTIPATION AND DIARRHEA: ICD-10-CM

## 2018-07-19 DIAGNOSIS — R10.2 PELVIC PRESSURE IN FEMALE: ICD-10-CM

## 2018-07-19 DIAGNOSIS — N95.2 VAGINAL ATROPHY: ICD-10-CM

## 2018-07-19 DIAGNOSIS — R39.15 URINARY URGENCY: ICD-10-CM

## 2018-07-19 DIAGNOSIS — N39.0 FREQUENT UTI: Primary | ICD-10-CM

## 2018-07-19 PROCEDURE — 99213 OFFICE O/P EST LOW 20 MIN: CPT | Mod: S$GLB,,, | Performed by: OBSTETRICS & GYNECOLOGY

## 2018-07-19 PROCEDURE — 99213 OFFICE O/P EST LOW 20 MIN: CPT | Mod: PBBFAC | Performed by: OBSTETRICS & GYNECOLOGY

## 2018-07-19 PROCEDURE — 99999 PR PBB SHADOW E&M-EST. PATIENT-LVL III: CPT | Mod: PBBFAC,,, | Performed by: OBSTETRICS & GYNECOLOGY

## 2018-07-19 NOTE — PROGRESS NOTES
Urogyn follow up  2018    [facility]  [address] [address]    Lenora Santiago  [id number]  1960      Lenora Santiago is a 58 y.o.  here for a urogyn follow up.    1)  UI:  (--) Daytime frequency: Q 5 hours.  Nocturia: No  (--) dysuria,  (+) hematuria,  (+) frequent UTIs.  (--) complete bladder emptying. Two UTI in the last year, one last July with jericho blood (C&S per URO), and the last one in Feb (C&S per URO). The most recent UTI had jericho blood in it. This caused her to go to the urologist.  Was 7 day course of bactrim as per urologist, and resolved the UTI. Most recent bladder scan was done and was benign. Since last UTI has had some feeling of incomplete emptying (Credes to help with small amount) and PV dribbling.     2)  POP:  Absent. (--) vaginal bleeding. - vaginal discharge. (--) sexually active.  (--) dyspareunia. (--)  Vaginal dryness.  (--) vaginal estrogen use.     3)  BM:  (--) constipation/straining.  Has irritable bowel with no pain. Flares every 6 weeks, induced by stress/poor sleep/eating wrong foods late at night.  Does not take any medications for this, but takes pro-biotics. Does not currently have issues, pain, or flare-ups.(--) hematochezia.  (--) fecal incontinence.  (--) fecal smearing/urgency.  (+) complete evacuation.      Past Medical History  No past medical history on file.     Past Surgical History  Past Surgical History:   Procedure Laterality Date    APPENDECTOMY     xlap/appy    Hysterectomy: No    Past Ob History     x 2.  .    Largest infant weight: 9lb 13 oz  unknown FAVD. no episiotomy.  Had rectal tears during the pregnancy?    Gynecologic History  LMP: No LMP recorded. Patient is postmenopausal.  Age of menarche: 9  Age of menopause: 50; Was on replacement hormones when she initially started menopause for sleep deprivation. Course was for 3 months.   Menstrual history: Painful, sporadic, periods were  28-31 days.   Pap test: No.  History of abnormal paps: No.  History of STIs:  No  Mammogram: Date of last: 2015.  Result: Normal (fibrocystic changes)  Colonoscopy: Date of last: Never had one. Repeat due:  Now.        Issues include:  Patient Active Problem List   Diagnosis    Cervical cancer screening    Frequent UTI    Vaginal atrophy    Encounter for screening mammogram for breast cancer    Urinary urgency    Post-void dribbling    Pelvic pressure in female    Irritable bowel syndrome with both constipation and diarrhea    Thyroid nodule    BMI 35.0-35.9,adult    Other fatigue     History since last visit:     1)  Frequent UTIs (bladder infections):  --urine C&S 4/18 and 6/18 NEG  --no s/sx today  --follow UTI prevention tips (see attached)  --control bowel movements/fecal cross-contamination  --treating vaginal atrophy (dryness)  --empty bladder before and after intercourse  --continue taking 1 probiotic pill (any with lactobacillus and/or acidophilus) daily  --CT A/P from OSH 2018 reviewed: no obvious contributors; cystoscopy 4/12/18 normal    2)  Vaginal atrophy (dryness):  Using intermittently.  When used daily, had some sensitivity.      3)  Irritable bowel-C/D:    --better s/p PT  --taking fiber 1 tsps/day  --continue daily magnesium oxide 400 IU or mg a day  --continue daily probiotic pill (lactobacillis, acidophilus)--any brand ok  --rectocele: absent    4)  Post-void urgency, pelvic pressure, post-void dribbling:   --much improved; feels pretty happy  --finished with PT in ; doing on own now  --trying to do timed voids    5)  Well-woman:  --pap 4/18 normal  --Mammogram: 5/18 normal  --Colonoscopy: please schedule locally    Medications:    Current Outpatient Prescriptions:     cefdinir (OMNICEF) 300 MG capsule, Take 300 mg by mouth 2 (two) times daily., Disp: , Rfl:     conjugated estrogens (PREMARIN) vaginal cream, 0.5 g with applicator or dime-sized amt with finger in vagina nightly x 2  "weeks, then twice a week thereafter, Disp: 30 g, Rfl: 12    naproxen (NAPROSYN) 500 MG tablet, Take 1 tablet (500 mg total) by mouth 2 (two) times daily with meals., Disp: 30 tablet, Rfl: 0    oxybutynin (DITROPAN) 5 MG Tab, 1-2 tablets (5-10 mg) PO up to 3 times/day for bladder flares, Disp: 30 tablet, Rfl: 11      ROS:  As per HPI.      Exam  /72 (BP Location: Left arm, Patient Position: Sitting, BP Method: Large (Manual))   Ht 5' 4" (1.626 m)   Wt 96.1 kg (211 lb 13.8 oz)   BMI 36.37 kg/m²   General: alert and oriented, no acute distress  Remainder of PE deferred.    Impression  1. Frequent UTI     2. Post-void dribbling     3. Urinary urgency     4. Vaginal atrophy     5. Irritable bowel syndrome with both constipation and diarrhea     6. Pelvic pressure in female       We reviewed the above issues and discussed options for short-term versus long-term management of her problems.   Plan:   1)  Frequent UTIs (bladder infections)  --If you feel like you have a UTI, please call our office so that we can place an order for you to drop off a urine specimen at the closest [facility] lab (we will arrange).     --We will call in antibiotics for you to start right after you drop off specimen.     --In this way, we can determine:     1)  Do you have a UTI?      2) If you have a UTI, is it sensitive to the antibiotics we prescribed?   --follow UTI prevention tips (see attached)  --control bowel movements/fecal cross-contamination  --treat vaginal atrophy (dryness)  --empty bladder before and after intercourse  --continue taking 1 probiotic pill (any with lactobacillus and/or acidophilus) daily  --CT A/P from OSH 2018 reviewed: no obvious contributors; will do cystoscopy    2)  Vaginal atrophy (dryness):  Use 0.5 gram of estrogen cream in vagina twice a week at night before bed.  Also use small amount with finger around vaginal opening/inner lips at same frequency.      3)  Irritable bowel-C/D:    --look at diet for " exacerbating foods and decrease; keep diary (see handout)  --work on stress control; this can affect bowel movements  --increase hydration: drink 3-4 bottles of water/day in addition to what you drink with meals  --watch caffeine intake; make sure to replace lost water after/before drinking caffeine  --get regular exercise  --continue daily fiber  --continue daily magnesium oxide 400 IU or mg a day  --continue daily probiotic pill (lactobacillis, acidophilus)--any brand ok  --for constipation: use stool softener (ducosate sodium) 1-2 times/day  --if having trouble completing evacuation:  Get small footstool (less than 6 inches) or large book and place feet on when using bathroom   --consider medication if refractory; consider evaluation for low motility syndrome if refractory   --rectocele: absent    4)  Post-void urgency, pelvic pressure, post-void dribbling:   --Empty bladder every 3 hours.  Empty well: wait a minute, lean forward on toilet.    --Avoid dietary irritants (see sheet).  Keep diary x 3-5 days to determine your irritants.  --continue pelvic floor PT exercises, home regimen  --URGE: consider medication in future.  Takes 2-4 weeks to see if will have effect.  For dry mouth: get sour, sugar free lozenge or gum.      5)  Well-woman:  --pap 4/18 normal  --Mammogram: 5/18 normal  --Colonoscopy: please schedule locally    6)  RTC 1 year.  JAI Jones. PCP: Nydia Ibrahim MD in [facility].     30 minutes were spent in face to face time with this patient  100 % of this time was spent in counseling and/or coordination of care     Umm Keyes MD  [facility]  Division of Female Pelvic Medicine and Reconstructive Surgery  Department of Obstetrics & Gynecology

## 2018-07-19 NOTE — PATIENT INSTRUCTIONS
1)  Frequent UTIs (bladder infections)  --If you feel like you have a UTI, please call our office so that we can place an order for you to drop off a urine specimen at the closest [facility] lab (we will arrange).     --We will call in antibiotics for you to start right after you drop off specimen.     --In this way, we can determine:     1)  Do you have a UTI?      2) If you have a UTI, is it sensitive to the antibiotics we prescribed?   --follow UTI prevention tips (see attached)  --control bowel movements/fecal cross-contamination  --treat vaginal atrophy (dryness)  --empty bladder before and after intercourse  --continue taking 1 probiotic pill (any with lactobacillus and/or acidophilus) daily  --CT A/P from OSH 2018 reviewed: no obvious contributors; will do cystoscopy    2)  Vaginal atrophy (dryness):  Use 0.5 gram of estrogen cream in vagina twice a week at night before bed.  Also use small amount with finger around vaginal opening/inner lips at same frequency.      3)  Irritable bowel-C/D:    --look at diet for exacerbating foods and decrease; keep diary (see handout)  --work on stress control; this can affect bowel movements  --increase hydration: drink 3-4 bottles of water/day in addition to what you drink with meals  --watch caffeine intake; make sure to replace lost water after/before drinking caffeine  --get regular exercise  --continue daily fiber  --continue daily magnesium oxide 400 IU or mg a day  --continue daily probiotic pill (lactobacillis, acidophilus)--any brand ok  --for constipation: use stool softener (ducosate sodium) 1-2 times/day  --if having trouble completing evacuation:  Get small footstool (less than 6 inches) or large book and place feet on when using bathroom   --consider medication if refractory; consider evaluation for low motility syndrome if refractory   --rectocele: absent    4)  Post-void urgency, pelvic pressure, post-void dribbling:   --Empty bladder every 3 hours.  Empty  well: wait a minute, lean forward on toilet.    --Avoid dietary irritants (see sheet).  Keep diary x 3-5 days to determine your irritants.  --continue pelvic floor PT exercises, home regimen  --URGE: consider medication in future.  Takes 2-4 weeks to see if will have effect.  For dry mouth: get sour, sugar free lozenge or gum.      5)  Well-woman:  --pap 4/18 normal  --Mammogram: 5/18 normal  --Colonoscopy: please schedule locally    6)  RTC 1 year.  JAI Jones. PCP: Nydia Ibrahim MD in [facility].

## 2018-09-10 ENCOUNTER — TELEPHONE (OUTPATIENT)
Dept: ENDOCRINOLOGY | Facility: CLINIC | Age: 58
End: 2018-09-10

## 2018-09-10 DIAGNOSIS — E04.1 THYROID NODULE: Primary | ICD-10-CM

## 2018-09-10 NOTE — TELEPHONE ENCOUNTER
Patient cancelled thyroid u/s in Endocrine due to  being in the hospital. She wants to have it done in [place]. Please place new orders to be done in Radiology and Renata will reschedule

## 2018-09-20 ENCOUNTER — HOSPITAL ENCOUNTER (OUTPATIENT)
Dept: RADIOLOGY | Facility: HOSPITAL | Age: 58
Discharge: HOME | End: 2018-09-20
Attending: INTERNAL MEDICINE
Payer: COMMERCIAL

## 2018-09-20 DIAGNOSIS — E04.1 THYROID NODULE: ICD-10-CM

## 2018-09-20 PROCEDURE — 76536 US EXAM OF HEAD AND NECK: CPT | Mod: 26,,, | Performed by: RADIOLOGY

## 2018-09-20 PROCEDURE — 76536 US EXAM OF HEAD AND NECK: CPT | Mod: TC

## 2018-09-21 ENCOUNTER — PATIENT MESSAGE (OUTPATIENT)
Dept: ENDOCRINOLOGY | Facility: CLINIC | Age: 58
End: 2018-09-21

## 2018-09-21 ENCOUNTER — PATIENT MESSAGE (OUTPATIENT)
Dept: ENDOCRINOLOGY | Facility: HOSPITAL | Age: 58
End: 2018-09-21

## 2018-09-24 ENCOUNTER — PATIENT MESSAGE (OUTPATIENT)
Dept: ENDOCRINOLOGY | Facility: CLINIC | Age: 58
End: 2018-09-24

## 2018-10-02 ENCOUNTER — TELEPHONE (OUTPATIENT)
Dept: ENDOCRINOLOGY | Facility: CLINIC | Age: 58
End: 2018-10-02

## 2018-10-02 DIAGNOSIS — E04.1 THYROID NODULE: Primary | ICD-10-CM

## 2018-10-02 NOTE — TELEPHONE ENCOUNTER
Returned pt call.   Pt said she needs to be scheduled for an FNA but hadn't heard back yet about scheduling it.   Let pt know I would reach out to the provider to have orders placed and would call her to schedule then.

## 2018-10-02 NOTE — TELEPHONE ENCOUNTER
----- Message from Genie Dallas sent at 10/2/2018  3:46 PM CDT -----  Contact: PT  Pt would like to be called back regarding a appt for a biopsy     Pt can be reached at [phone]

## 2018-10-31 ENCOUNTER — HOSPITAL ENCOUNTER (OUTPATIENT)
Dept: ENDOCRINOLOGY | Facility: CLINIC | Age: 58
Discharge: HOME | End: 2018-10-31
Attending: INTERNAL MEDICINE
Payer: COMMERCIAL

## 2018-10-31 DIAGNOSIS — E04.1 THYROID NODULE: ICD-10-CM

## 2018-10-31 PROCEDURE — 76942 ECHO GUIDE FOR BIOPSY: CPT | Mod: S$GLB,,, | Performed by: INTERNAL MEDICINE

## 2018-10-31 PROCEDURE — 88173 CYTOPATH EVAL FNA REPORT: CPT | Performed by: PATHOLOGY

## 2018-10-31 PROCEDURE — 10022 US FINE NEEDLE ASPIRATION WITH IMAGING: CPT | Mod: S$GLB,,, | Performed by: INTERNAL MEDICINE

## 2018-11-06 ENCOUNTER — TELEPHONE (OUTPATIENT)
Dept: ENDOCRINOLOGY | Facility: CLINIC | Age: 58
End: 2018-11-06

## 2018-11-06 NOTE — TELEPHONE ENCOUNTER
Her  messaged us via his patient portal regarding her FNA results. Dr Portillo recorded as benign and reported I reported this to him and said she would probably just need yearly follow up.  Please review and advise.  I also mentioned to him it would have been best to message us via her patient portal rather then his for accurate patient  documentation.

## 2018-11-06 NOTE — TELEPHONE ENCOUNTER
Dr Gonzales sent a patient portal message attached to pathology report this morning notifying patient of normal results and recommendations of yearly follow up.  I also left a voicemail message informing patient as well.

## 2019-01-07 ENCOUNTER — PATIENT MESSAGE (OUTPATIENT)
Dept: UROGYNECOLOGY | Facility: CLINIC | Age: 59
End: 2019-01-07

## 2019-01-07 ENCOUNTER — TELEPHONE (OUTPATIENT)
Dept: UROGYNECOLOGY | Facility: CLINIC | Age: 59
End: 2019-01-07

## 2019-01-07 ENCOUNTER — LAB VISIT (OUTPATIENT)
Dept: LAB | Facility: HOSPITAL | Age: 59
End: 2019-01-07
Attending: NURSE PRACTITIONER
Payer: COMMERCIAL

## 2019-01-07 DIAGNOSIS — N30.01 ACUTE CYSTITIS WITH HEMATURIA: ICD-10-CM

## 2019-01-07 DIAGNOSIS — R30.0 DYSURIA: ICD-10-CM

## 2019-01-07 DIAGNOSIS — R30.0 DYSURIA: Primary | ICD-10-CM

## 2019-01-07 DIAGNOSIS — N30.01 ACUTE CYSTITIS WITH HEMATURIA: Primary | ICD-10-CM

## 2019-01-07 LAB
BACTERIA #/AREA URNS HPF: ABNORMAL /HPF
BILIRUB UR QL STRIP: NEGATIVE
BILIRUB UR QL STRIP: NEGATIVE
CLARITY UR: CLEAR
CLARITY UR: CLEAR
COLOR UR: YELLOW
COLOR UR: YELLOW
GLUCOSE UR QL STRIP: NEGATIVE
GLUCOSE UR QL STRIP: NEGATIVE
HGB UR QL STRIP: ABNORMAL
HGB UR QL STRIP: ABNORMAL
KETONES UR QL STRIP: NEGATIVE
KETONES UR QL STRIP: NEGATIVE
LEUKOCYTE ESTERASE UR QL STRIP: ABNORMAL
LEUKOCYTE ESTERASE UR QL STRIP: ABNORMAL
MICROSCOPIC COMMENT: ABNORMAL
NITRITE UR QL STRIP: NEGATIVE
NITRITE UR QL STRIP: NEGATIVE
PH UR STRIP: 8 [PH] (ref 5–8)
PH UR STRIP: 8 [PH] (ref 5–8)
PROT UR QL STRIP: NEGATIVE
PROT UR QL STRIP: NEGATIVE
RBC #/AREA URNS HPF: 1 /HPF (ref 0–4)
SP GR UR STRIP: <=1.005 (ref 1–1.03)
SP GR UR STRIP: <=1.005 (ref 1–1.03)
SQUAMOUS #/AREA URNS HPF: 2 /HPF
URN SPEC COLLECT METH UR: ABNORMAL
URN SPEC COLLECT METH UR: ABNORMAL
UROBILINOGEN UR STRIP-ACNC: NEGATIVE EU/DL
UROBILINOGEN UR STRIP-ACNC: NEGATIVE EU/DL
WBC #/AREA URNS HPF: 6 /HPF (ref 0–5)

## 2019-01-07 PROCEDURE — 87086 URINE CULTURE/COLONY COUNT: CPT

## 2019-01-07 PROCEDURE — 81000 URINALYSIS NONAUTO W/SCOPE: CPT

## 2019-01-07 NOTE — TELEPHONE ENCOUNTER
Pt. Returned my call, and was instructed to go to her nearest [facility] to drop off a urine sample, she said she woud,and thank you so much for calling her back, I did mention to her I will forward her previous email to our NP Marcelina, she said okay

## 2019-01-07 NOTE — TELEPHONE ENCOUNTER
----- Message from Mumtaz López sent at 1/7/2019  9:10 AM CST -----  Contact: RULA HORNE [[id number]]  Name of Who is Calling: RULA HORNE [[id number]]       What is the request in detail: Patient returned miss call from Nurse. A call back is requested. Please advise.   Thanks-     Can the clinic reply by [facility]: No       What Number to Call Back if not in [facility]: [phone]

## 2019-01-09 ENCOUNTER — PATIENT MESSAGE (OUTPATIENT)
Dept: UROGYNECOLOGY | Facility: CLINIC | Age: 59
End: 2019-01-09

## 2019-01-09 LAB — BACTERIA UR CULT: NORMAL

## 2019-06-04 ENCOUNTER — OFFICE VISIT (OUTPATIENT)
Dept: URGENT CARE | Facility: CLINIC | Age: 59
End: 2019-06-04
Payer: COMMERCIAL

## 2019-06-04 VITALS
TEMPERATURE: 98 F | RESPIRATION RATE: 18 BRPM | HEIGHT: 64 IN | HEART RATE: 84 BPM | DIASTOLIC BLOOD PRESSURE: 68 MMHG | OXYGEN SATURATION: 99 % | WEIGHT: 203.5 LBS | SYSTOLIC BLOOD PRESSURE: 110 MMHG | BODY MASS INDEX: 34.74 KG/M2

## 2019-06-04 DIAGNOSIS — M25.571 ACUTE RIGHT ANKLE PAIN: Primary | ICD-10-CM

## 2019-06-04 PROCEDURE — 99203 OFFICE O/P NEW LOW 30 MIN: CPT | Mod: S$GLB,,, | Performed by: FAMILY MEDICINE

## 2019-06-04 PROCEDURE — 99999 PR PBB SHADOW E&M-EST. PATIENT-LVL IV: CPT | Mod: PBBFAC,,, | Performed by: FAMILY MEDICINE

## 2019-06-04 PROCEDURE — 99999 PR PBB SHADOW E&M-EST. PATIENT-LVL IV: ICD-10-PCS | Mod: PBBFAC,,, | Performed by: FAMILY MEDICINE

## 2019-06-04 PROCEDURE — 3008F BODY MASS INDEX DOCD: CPT | Mod: CPTII,S$GLB,, | Performed by: FAMILY MEDICINE

## 2019-06-04 PROCEDURE — 99203 PR OFFICE/OUTPT VISIT, NEW, LEVL III, 30-44 MIN: ICD-10-PCS | Mod: S$GLB,,, | Performed by: FAMILY MEDICINE

## 2019-06-04 PROCEDURE — 3008F PR BODY MASS INDEX (BMI) DOCUMENTED: ICD-10-PCS | Mod: CPTII,S$GLB,, | Performed by: FAMILY MEDICINE

## 2019-06-04 NOTE — PATIENT INSTRUCTIONS
1. Rest, elevate, ice  2. Ace wrap for support  3. OTC ibuprofen 400 to 600 mg every 8 hours with food for 5 days  4. Consider PT if not better soon

## 2019-06-04 NOTE — PROGRESS NOTES
"Subjective:       Patient ID: Lenora Santiago is a 58 y.o. female.    Chief Complaint: Ankle Pain (right ankle swelling, no known injury )    /68   Pulse 84   Temp 97.7 °F (36.5 °C) (Tympanic)   Resp 18   Ht 5' 4" (1.626 m)   Wt 92.3 kg (203 lb 7.8 oz)   SpO2 99%   BMI 34.93 kg/m²     HPI  Right ankle swelling and pain for a few days, after wearing ill fitting shoes. Took motrin 800 mg daily, a bit help.     Review of Systems   Musculoskeletal: Positive for arthralgias and joint swelling.       Objective:      Physical Exam   Constitutional: She is oriented to person, place, and time. She appears well-developed and well-nourished. No distress.   HENT:   Head: Normocephalic and atraumatic.   Eyes: Pupils are equal, round, and reactive to light. EOM are normal.   Musculoskeletal:        Feet:    Neurological: She is alert and oriented to person, place, and time. No cranial nerve deficit.   Skin: Skin is warm and dry. She is not diaphoretic.   Nursing note and vitals reviewed.      Assessment:       1. Acute right ankle pain        Plan:     Lenora was seen today for ankle pain.    Diagnoses and all orders for this visit:    Acute right ankle pain    1. Rest, elevate, ice  2. Ace wrap for support  3. OTC ibuprofen 400 to 600 mg every 8 hours with food for 5 days  4. Consider PT if not better soon      "

## 2019-10-04 ENCOUNTER — TELEPHONE (OUTPATIENT)
Dept: UROGYNECOLOGY | Facility: CLINIC | Age: 59
End: 2019-10-04

## 2019-10-04 DIAGNOSIS — Z12.39 SCREENING BREAST EXAMINATION: Primary | ICD-10-CM

## 2019-10-04 NOTE — TELEPHONE ENCOUNTER
----- Message from Marcelina Jones NP sent at 10/3/2019  4:48 PM CDT -----  Can you call radiology and ask them how I order it?  I never can remember.    Thanks,  Marcelina  ----- Message -----  From: Hermann Fountain MA  Sent: 10/3/2019   4:26 PM CDT  To: Marcelina Jones NP    Pt requesting orders for 3D mammogram. Please advise

## 2019-10-07 ENCOUNTER — TELEPHONE (OUTPATIENT)
Dept: UROGYNECOLOGY | Facility: CLINIC | Age: 59
End: 2019-10-07

## 2019-10-07 NOTE — TELEPHONE ENCOUNTER
----- Message from Marcelina Jones NP sent at 10/4/2019  4:22 PM CDT -----  ORDERS PLACED.    [facility]    ----- Message -----  From: Hermann Fountain MA  Sent: 10/4/2019   3:47 PM CDT  To: Marcelina Jones NP    Spoke to radiology [facility], she states to schedule 3D mammogram: in [facility] select mammogram screening tomosynthesis with code Z12.31

## 2019-10-07 NOTE — TELEPHONE ENCOUNTER
Spoke to pt, she was informed orders were placed for 3D mammogram. Pt states she will call and schedule at her convenience.

## 2019-10-09 ENCOUNTER — TELEPHONE (OUTPATIENT)
Dept: UROGYNECOLOGY | Facility: CLINIC | Age: 59
End: 2019-10-09

## 2019-10-09 NOTE — TELEPHONE ENCOUNTER
Returned pt call and was able to get her schedule to see Marcelina since Dr Keyes didn't have anything until Jan.

## 2019-10-09 NOTE — TELEPHONE ENCOUNTER
----- Message from Emma Díaz sent at 10/9/2019 12:34 PM CDT -----  Contact: RULA HORNE [[id number]]  Name of Who is Calling: RULA HORNE [[id number]]    What is the request in detail: Patient is requesting an annual exam....Please contact to further discuss and advise      Can the clinic reply by [facility]: No     What Number to Call Back if not in [facility]: [phone]

## 2019-10-15 ENCOUNTER — HOSPITAL ENCOUNTER (OUTPATIENT)
Dept: RADIOLOGY | Facility: HOSPITAL | Age: 59
Discharge: HOME | End: 2019-10-15
Attending: NURSE PRACTITIONER
Payer: COMMERCIAL

## 2019-10-15 DIAGNOSIS — Z12.39 SCREENING BREAST EXAMINATION: ICD-10-CM

## 2019-10-15 PROCEDURE — 77067 SCR MAMMO BI INCL CAD: CPT | Mod: TC

## 2019-10-15 PROCEDURE — 77067 SCR MAMMO BI INCL CAD: CPT | Mod: 26,,, | Performed by: RADIOLOGY

## 2019-10-15 PROCEDURE — 77063 MAMMO DIGITAL SCREENING BILAT WITH TOMOSYNTHESIS_CAD: ICD-10-PCS | Mod: 26,,, | Performed by: RADIOLOGY

## 2019-10-15 PROCEDURE — 77067 MAMMO DIGITAL SCREENING BILAT WITH TOMOSYNTHESIS_CAD: ICD-10-PCS | Mod: 26,,, | Performed by: RADIOLOGY

## 2019-10-15 PROCEDURE — 77063 BREAST TOMOSYNTHESIS BI: CPT | Mod: 26,,, | Performed by: RADIOLOGY

## 2019-10-23 ENCOUNTER — PATIENT MESSAGE (OUTPATIENT)
Dept: UROGYNECOLOGY | Facility: CLINIC | Age: 59
End: 2019-10-23

## 2019-11-22 ENCOUNTER — IMMUNIZATION (OUTPATIENT)
Dept: PHARMACY | Facility: CLINIC | Age: 59
End: 2019-11-22
Payer: COMMERCIAL

## 2020-01-30 ENCOUNTER — OFFICE VISIT (OUTPATIENT)
Dept: FAMILY MEDICINE | Facility: CLINIC | Age: 60
End: 2020-01-30
Payer: COMMERCIAL

## 2020-01-30 VITALS
SYSTOLIC BLOOD PRESSURE: 149 MMHG | BODY MASS INDEX: 34.82 KG/M2 | HEART RATE: 76 BPM | OXYGEN SATURATION: 99 % | WEIGHT: 203.94 LBS | TEMPERATURE: 97 F | HEIGHT: 64 IN | DIASTOLIC BLOOD PRESSURE: 75 MMHG

## 2020-01-30 DIAGNOSIS — E78.5 HYPERLIPIDEMIA, UNSPECIFIED HYPERLIPIDEMIA TYPE: ICD-10-CM

## 2020-01-30 DIAGNOSIS — T46.6X5A STATIN MYOPATHY: ICD-10-CM

## 2020-01-30 DIAGNOSIS — B02.9 HERPES ZOSTER WITHOUT COMPLICATION: Primary | ICD-10-CM

## 2020-01-30 DIAGNOSIS — G72.0 STATIN MYOPATHY: ICD-10-CM

## 2020-01-30 PROCEDURE — 3008F PR BODY MASS INDEX (BMI) DOCUMENTED: ICD-10-PCS | Mod: CPTII,S$GLB,, | Performed by: FAMILY MEDICINE

## 2020-01-30 PROCEDURE — 99203 OFFICE O/P NEW LOW 30 MIN: CPT | Mod: S$GLB,,, | Performed by: FAMILY MEDICINE

## 2020-01-30 PROCEDURE — 99999 PR PBB SHADOW E&M-EST. PATIENT-LVL III: CPT | Mod: PBBFAC,,, | Performed by: FAMILY MEDICINE

## 2020-01-30 PROCEDURE — 3008F BODY MASS INDEX DOCD: CPT | Mod: CPTII,S$GLB,, | Performed by: FAMILY MEDICINE

## 2020-01-30 PROCEDURE — 99203 PR OFFICE/OUTPT VISIT, NEW, LEVL III, 30-44 MIN: ICD-10-PCS | Mod: S$GLB,,, | Performed by: FAMILY MEDICINE

## 2020-01-30 PROCEDURE — 99999 PR PBB SHADOW E&M-EST. PATIENT-LVL III: ICD-10-PCS | Mod: PBBFAC,,, | Performed by: FAMILY MEDICINE

## 2020-01-30 RX ORDER — VALACYCLOVIR HYDROCHLORIDE 1 G/1
1000 TABLET, FILM COATED ORAL 3 TIMES DAILY
Qty: 21 TABLET | Refills: 0 | Status: SHIPPED | OUTPATIENT
Start: 2020-01-30 | End: 2021-01-29

## 2020-01-30 NOTE — PATIENT INSTRUCTIONS
Shingles  Shingles is a viral infection caused by the same virus as chicken pox. Anyone who has had chicken pox may get shingles later in life. The virus stays in the body, but remains dormant (asleep). Shingles often occurs in older persons or persons with lowered immunity. But it can affect anyone at any age.  Shingles starts as a tingling patch of skin on one side of the body. Small, painful blisters may then appear. The rash does not spread to the rest of the body.  Exposure to shingles cannot cause shingles. However, it can cause chicken pox in anyone who has not had chicken pox or has not been vaccinated. The contagious period ends when all blisters have crusted over (generally about 2 weeks after the illness begins).  After the blisters heal, the affected skin may be sensitive or painful for months (neuralgia). This often gradually goes away.  A shingles vaccine is available. This can help prevent shingles or make it less painful. It is generally recommended for adults over the age of 60 who have had chicken pox in the past, but who have never had shingles. Adults over 60 who have had neither chicken pox nor shingles can prevent both diseases with the chicken pox vaccine. Ask your healthcare provider about these vaccines.  Home care  · Medicines may be prescribed to help relieve pain. Take these medicines as directed. Ask your healthcare provider or pharmacist before using over-the-counter medicines for helping treat pain and itching.  · In certain cases, antiviral medicines may be prescribed to reduce pain, shorten the illness, and prevent neuralgia. Take these medicines as directed.  · Compresses made from a solution of cool water mixed with cornstarch or baking soda may help relieve pain and itching.   · Gently wash skin daily with soap and water to help prevent infection.  Be certain to rinse off all of the soap, which can be irritating.  · Trim fingernails and try not to scratch. Scratching the sores  may leave scars.  · Stay home from work or school until all blisters have formed a crust and you are no longer contagious.  Follow-up care  Follow up with your healthcare provider or as directed by our staff.  When to seek medical advice  · Fever of 100.4°F (38°C) or higher, or as directed by your healthcare provider  · Affected skin is on the face or neck and any of the following occur:  ¨ Headache  ¨ Eye pain  ¨ Changes in vision  ¨ Sores near the eye  ¨ Weakness of facial muscles  · Pain, redness, or swelling of a joint  · Signs of skin infection: colored drainage from the sores, warmth, increasing redness, or increasing pain  Date Last Reviewed: 9/25/2015  © 6878-3241 [employer]. [address] [place] [address], [place] [address]. All rights reserved. This information is not intended as a substitute for professional medical care. Always follow your healthcare professional's instructions.

## 2020-01-30 NOTE — PROGRESS NOTES
"  Subjective:       Patient ID: Lenora Santiago is a 59 y.o. female.    Chief Complaint: No chief complaint on file.      HPI Comments:       Current Outpatient Medications:     valACYclovir (VALTREX) 1000 MG tablet, Take 1 tablet (1,000 mg total) by mouth 3 (three) times daily., Disp: 21 tablet, Rfl: 0      This my 1st time seeing this patient.  She does not go to doctors very often but needs a PCP.    Was in the shower this morning and felt a very sharp, burning pain around her right shoulder blade.  Looked in the mirror and saw a patch of erythema some vague blistering.  Has never had shingles before, but recognized it as similar to her 's lesions when he had shingles in the past.  Has not taken anything for the pain so far, and covered the area with a Band-Aid.  She has been under lot of stress lately because of her 's health problems.    Past medical history of hyperlipidemia with total cholesterol at 320 the the at 1 point.  Was on statins but did not tolerate Zocor because of muscle aches.  Never went on anything since.  Has work on her diet in the meantime.  No diabetes or hypertension.  Nonsmoker.    No other significant past medical history.  Not on any medications        Review of Systems   Constitutional: Negative for activity change, appetite change and fever.   HENT: Negative for sore throat.    Respiratory: Negative for cough and shortness of breath.    Cardiovascular: Negative for chest pain.   Gastrointestinal: Negative for abdominal pain, diarrhea and nausea.   Genitourinary: Negative for difficulty urinating.   Musculoskeletal: Negative for arthralgias and myalgias.   Skin: Positive for rash.   Neurological: Negative for dizziness and headaches.       Objective:      Vitals:    01/30/20 1639 01/30/20 1644   BP: (!) 149/75    Pulse: 76    Temp: 97.1 °F (36.2 °C)    TempSrc: Tympanic    SpO2: 99%    Weight:  92.5 kg (203 lb 14.8 oz)   Height: 5' 4" (1.626 m)    PainSc: 0-No pain  "     Physical Exam   Constitutional: She is oriented to person, place, and time. She appears well-developed and well-nourished. No distress.   HENT:   Head: Normocephalic.   Neck: Neck supple. No thyromegaly present.   Cardiovascular: Normal rate, regular rhythm and normal heart sounds.   No murmur heard.  Pulmonary/Chest: Effort normal and breath sounds normal. She has no wheezes. She has no rales.   Abdominal: Soft. She exhibits no distension.   Musculoskeletal: She exhibits no edema.        Back:    Lymphadenopathy:     She has no cervical adenopathy.   Neurological: She is alert and oriented to person, place, and time.   Skin: Skin is warm and dry. She is not diaphoretic.   Psychiatric: She has a normal mood and affect. Her behavior is normal. Judgment and thought content normal.   Nursing note and vitals reviewed.      Assessment:       1. Herpes zoster without complication    2. Hyperlipidemia, unspecified hyperlipidemia type    3. Statin myopathy        Plan:   Herpes zoster without complication  Comments:  Valtrex 1 g t.i.d. x7 days.  Discussed possibility of post herpetic neuralgia.  Over-the-counter analgesics for pain relief.  Written info given    Hyperlipidemia, unspecified hyperlipidemia type  Comments:  Follow-up for physical in the next several weeks    Statin myopathy  Comments:  Apparently only 1 statin has been tried    Other orders  -     valACYclovir (VALTREX) 1000 MG tablet; Take 1 tablet (1,000 mg total) by mouth 3 (three) times daily.  Dispense: 21 tablet; Refill: 0

## 2020-01-31 ENCOUNTER — TELEPHONE (OUTPATIENT)
Dept: FAMILY MEDICINE | Facility: CLINIC | Age: 60
End: 2020-01-31

## 2020-01-31 NOTE — TELEPHONE ENCOUNTER
Pt informed She can either stop the medication or take it less frequently.  Either way the shingles should get better without it. Pt verbalized understanding of information and states she will d/c meds due to side effects.

## 2020-01-31 NOTE — TELEPHONE ENCOUNTER
Pt states valacyclovir makes her very weak and fatigued and wants to know if she should discontinue medication.

## 2020-01-31 NOTE — TELEPHONE ENCOUNTER
----- Message from Sonja Liz sent at 1/31/2020  3:51 PM CST -----  Contact: self  Requesting call back regarding questions on if pt should continue or stop taking rx valacyclovir due to it makes her very weak and fatigue. Please call back at [phone].    Thanks,  Sonja Liz

## 2020-11-03 ENCOUNTER — IMMUNIZATION (OUTPATIENT)
Dept: PHARMACY | Facility: CLINIC | Age: 60
End: 2020-11-03
Payer: COMMERCIAL

## 2021-01-06 DIAGNOSIS — Z12.31 OTHER SCREENING MAMMOGRAM: ICD-10-CM

## 2021-01-28 ENCOUNTER — OFFICE VISIT (OUTPATIENT)
Dept: OPHTHALMOLOGY | Facility: CLINIC | Age: 61
End: 2021-01-28
Payer: COMMERCIAL

## 2021-01-28 DIAGNOSIS — H25.13 AGE-RELATED NUCLEAR CATARACT OF BOTH EYES: Primary | ICD-10-CM

## 2021-01-28 DIAGNOSIS — H52.7 REFRACTIVE DISORDER: ICD-10-CM

## 2021-01-28 PROCEDURE — 99999 PR PBB SHADOW E&M-EST. PATIENT-LVL II: ICD-10-PCS | Mod: PBBFAC,,, | Performed by: STUDENT IN AN ORGANIZED HEALTH CARE EDUCATION/TRAINING PROGRAM

## 2021-01-28 PROCEDURE — 92015 PR REFRACTION: ICD-10-PCS | Mod: S$GLB,,, | Performed by: STUDENT IN AN ORGANIZED HEALTH CARE EDUCATION/TRAINING PROGRAM

## 2021-01-28 PROCEDURE — 99999 PR PBB SHADOW E&M-EST. PATIENT-LVL II: CPT | Mod: PBBFAC,,, | Performed by: STUDENT IN AN ORGANIZED HEALTH CARE EDUCATION/TRAINING PROGRAM

## 2021-01-28 PROCEDURE — 92002 INTRM OPH EXAM NEW PATIENT: CPT | Mod: S$GLB,,, | Performed by: STUDENT IN AN ORGANIZED HEALTH CARE EDUCATION/TRAINING PROGRAM

## 2021-01-28 PROCEDURE — 92002 PR EYE EXAM, NEW PATIENT,INTERMED: ICD-10-PCS | Mod: S$GLB,,, | Performed by: STUDENT IN AN ORGANIZED HEALTH CARE EDUCATION/TRAINING PROGRAM

## 2021-01-28 PROCEDURE — 92015 DETERMINE REFRACTIVE STATE: CPT | Mod: S$GLB,,, | Performed by: STUDENT IN AN ORGANIZED HEALTH CARE EDUCATION/TRAINING PROGRAM

## 2021-02-24 ENCOUNTER — PATIENT OUTREACH (OUTPATIENT)
Dept: ADMINISTRATIVE | Facility: HOSPITAL | Age: 61
End: 2021-02-24

## 2021-02-25 ENCOUNTER — HOSPITAL ENCOUNTER (OUTPATIENT)
Dept: RADIOLOGY | Facility: HOSPITAL | Age: 61
Discharge: HOME | End: 2021-02-25
Attending: FAMILY MEDICINE
Payer: COMMERCIAL

## 2021-02-25 DIAGNOSIS — Z12.31 OTHER SCREENING MAMMOGRAM: ICD-10-CM

## 2021-02-25 PROCEDURE — 77067 SCR MAMMO BI INCL CAD: CPT | Mod: 26,,, | Performed by: RADIOLOGY

## 2021-02-25 PROCEDURE — 77063 BREAST TOMOSYNTHESIS BI: CPT | Mod: 26,,, | Performed by: RADIOLOGY

## 2021-02-25 PROCEDURE — 77067 SCR MAMMO BI INCL CAD: CPT | Mod: TC

## 2021-02-25 PROCEDURE — 77063 MAMMO DIGITAL SCREENING BILAT WITH TOMO: ICD-10-PCS | Mod: 26,,, | Performed by: RADIOLOGY

## 2021-02-25 PROCEDURE — 77067 MAMMO DIGITAL SCREENING BILAT WITH TOMO: ICD-10-PCS | Mod: 26,,, | Performed by: RADIOLOGY

## 2021-02-26 ENCOUNTER — OFFICE VISIT (OUTPATIENT)
Dept: FAMILY MEDICINE | Facility: CLINIC | Age: 61
End: 2021-02-26
Payer: COMMERCIAL

## 2021-02-26 VITALS
DIASTOLIC BLOOD PRESSURE: 66 MMHG | WEIGHT: 205.5 LBS | BODY MASS INDEX: 35.08 KG/M2 | TEMPERATURE: 98 F | HEART RATE: 91 BPM | SYSTOLIC BLOOD PRESSURE: 120 MMHG | OXYGEN SATURATION: 97 % | HEIGHT: 64 IN

## 2021-02-26 DIAGNOSIS — R07.89 CHEST PRESSURE: ICD-10-CM

## 2021-02-26 DIAGNOSIS — R73.03 PREDIABETES: ICD-10-CM

## 2021-02-26 DIAGNOSIS — G72.0 STATIN MYOPATHY: ICD-10-CM

## 2021-02-26 DIAGNOSIS — R00.2 PALPITATIONS: ICD-10-CM

## 2021-02-26 DIAGNOSIS — E04.1 THYROID NODULE: ICD-10-CM

## 2021-02-26 DIAGNOSIS — T46.6X5A STATIN MYOPATHY: ICD-10-CM

## 2021-02-26 DIAGNOSIS — E55.9 VITAMIN D DEFICIENCY: ICD-10-CM

## 2021-02-26 DIAGNOSIS — Z12.11 SCREENING FOR COLON CANCER: ICD-10-CM

## 2021-02-26 DIAGNOSIS — E78.5 HYPERLIPIDEMIA, UNSPECIFIED HYPERLIPIDEMIA TYPE: ICD-10-CM

## 2021-02-26 DIAGNOSIS — H81.10 BENIGN PAROXYSMAL POSITIONAL VERTIGO, UNSPECIFIED LATERALITY: ICD-10-CM

## 2021-02-26 DIAGNOSIS — Z00.00 ANNUAL PHYSICAL EXAM: Primary | ICD-10-CM

## 2021-02-26 PROCEDURE — 99396 PREV VISIT EST AGE 40-64: CPT | Mod: S$GLB,,, | Performed by: FAMILY MEDICINE

## 2021-02-26 PROCEDURE — 99396 PR PREVENTIVE VISIT,EST,40-64: ICD-10-PCS | Mod: S$GLB,,, | Performed by: FAMILY MEDICINE

## 2021-02-26 PROCEDURE — 99214 OFFICE O/P EST MOD 30 MIN: CPT | Mod: PBBFAC,PO | Performed by: FAMILY MEDICINE

## 2021-02-26 PROCEDURE — 99999 PR PBB SHADOW E&M-EST. PATIENT-LVL IV: ICD-10-PCS | Mod: PBBFAC,,, | Performed by: FAMILY MEDICINE

## 2021-02-26 PROCEDURE — 99999 PR PBB SHADOW E&M-EST. PATIENT-LVL IV: CPT | Mod: PBBFAC,,, | Performed by: FAMILY MEDICINE

## 2021-02-27 ENCOUNTER — LAB VISIT (OUTPATIENT)
Dept: LAB | Facility: HOSPITAL | Age: 61
End: 2021-02-27
Attending: FAMILY MEDICINE
Payer: COMMERCIAL

## 2021-02-27 DIAGNOSIS — R00.2 PALPITATIONS: ICD-10-CM

## 2021-02-27 DIAGNOSIS — E55.9 VITAMIN D DEFICIENCY: ICD-10-CM

## 2021-02-27 DIAGNOSIS — Z00.00 ANNUAL PHYSICAL EXAM: ICD-10-CM

## 2021-02-27 DIAGNOSIS — E78.5 HYPERLIPIDEMIA, UNSPECIFIED HYPERLIPIDEMIA TYPE: ICD-10-CM

## 2021-02-27 DIAGNOSIS — E04.1 THYROID NODULE: ICD-10-CM

## 2021-02-27 DIAGNOSIS — R73.03 PREDIABETES: ICD-10-CM

## 2021-02-27 LAB
25(OH)D3+25(OH)D2 SERPL-MCNC: 22 NG/ML (ref 30–96)
ALBUMIN SERPL BCP-MCNC: 4.2 G/DL (ref 3.5–5.2)
ALP SERPL-CCNC: 75 U/L (ref 55–135)
ALT SERPL W/O P-5'-P-CCNC: 39 U/L (ref 10–44)
ANION GAP SERPL CALC-SCNC: 8 MMOL/L (ref 8–16)
AST SERPL-CCNC: 27 U/L (ref 10–40)
BASOPHILS # BLD AUTO: 0.05 K/UL (ref 0–0.2)
BASOPHILS NFR BLD: 0.9 % (ref 0–1.9)
BILIRUB SERPL-MCNC: 0.7 MG/DL (ref 0.1–1)
BUN SERPL-MCNC: 10 MG/DL (ref 8–23)
CALCIUM SERPL-MCNC: 9.1 MG/DL (ref 8.7–10.5)
CHLORIDE SERPL-SCNC: 104 MMOL/L (ref 95–110)
CHOLEST SERPL-MCNC: 263 MG/DL (ref 120–199)
CHOLEST/HDLC SERPL: 5.8 {RATIO} (ref 2–5)
CO2 SERPL-SCNC: 28 MMOL/L (ref 23–29)
CREAT SERPL-MCNC: 0.8 MG/DL (ref 0.5–1.4)
DIFFERENTIAL METHOD: ABNORMAL
EOSINOPHIL # BLD AUTO: 0.2 K/UL (ref 0–0.5)
EOSINOPHIL NFR BLD: 2.6 % (ref 0–8)
ERYTHROCYTE [DISTWIDTH] IN BLOOD BY AUTOMATED COUNT: 13.1 % (ref 11.5–14.5)
EST. GFR  (AFRICAN AMERICAN): >60 ML/MIN/1.73 M^2
EST. GFR  (NON AFRICAN AMERICAN): >60 ML/MIN/1.73 M^2
ESTIMATED AVG GLUCOSE: 111 MG/DL (ref 68–131)
GLUCOSE SERPL-MCNC: 113 MG/DL (ref 70–110)
HBA1C MFR BLD: 5.5 % (ref 4–5.6)
HCT VFR BLD AUTO: 44.1 % (ref 37–48.5)
HDLC SERPL-MCNC: 45 MG/DL (ref 40–75)
HDLC SERPL: 17.1 % (ref 20–50)
HGB BLD-MCNC: 14 G/DL (ref 12–16)
IMM GRANULOCYTES # BLD AUTO: 0 K/UL (ref 0–0.04)
IMM GRANULOCYTES NFR BLD AUTO: 0 % (ref 0–0.5)
LDLC SERPL CALC-MCNC: 186 MG/DL (ref 63–159)
LYMPHOCYTES # BLD AUTO: 2.3 K/UL (ref 1–4.8)
LYMPHOCYTES NFR BLD: 40.5 % (ref 18–48)
MCH RBC QN AUTO: 29.1 PG (ref 27–31)
MCHC RBC AUTO-ENTMCNC: 31.7 G/DL (ref 32–36)
MCV RBC AUTO: 92 FL (ref 82–98)
MONOCYTES # BLD AUTO: 0.4 K/UL (ref 0.3–1)
MONOCYTES NFR BLD: 7.7 % (ref 4–15)
NEUTROPHILS # BLD AUTO: 2.8 K/UL (ref 1.8–7.7)
NEUTROPHILS NFR BLD: 48.3 % (ref 38–73)
NONHDLC SERPL-MCNC: 218 MG/DL
NRBC BLD-RTO: 0 /100 WBC
PLATELET # BLD AUTO: 263 K/UL (ref 150–350)
PMV BLD AUTO: 10.1 FL (ref 9.2–12.9)
POTASSIUM SERPL-SCNC: 4.2 MMOL/L (ref 3.5–5.1)
PROT SERPL-MCNC: 7.7 G/DL (ref 6–8.4)
RBC # BLD AUTO: 4.81 M/UL (ref 4–5.4)
SODIUM SERPL-SCNC: 140 MMOL/L (ref 136–145)
TRIGL SERPL-MCNC: 160 MG/DL (ref 30–150)
TSH SERPL DL<=0.005 MIU/L-ACNC: 2.14 UIU/ML (ref 0.4–4)
WBC # BLD AUTO: 5.7 K/UL (ref 3.9–12.7)

## 2021-02-27 PROCEDURE — 84443 ASSAY THYROID STIM HORMONE: CPT

## 2021-02-27 PROCEDURE — 36415 COLL VENOUS BLD VENIPUNCTURE: CPT

## 2021-02-27 PROCEDURE — 86803 HEPATITIS C AB TEST: CPT

## 2021-02-27 PROCEDURE — 82306 VITAMIN D 25 HYDROXY: CPT

## 2021-02-27 PROCEDURE — 86703 HIV-1/HIV-2 1 RESULT ANTBDY: CPT

## 2021-02-27 PROCEDURE — 83036 HEMOGLOBIN GLYCOSYLATED A1C: CPT

## 2021-02-27 PROCEDURE — 80061 LIPID PANEL: CPT

## 2021-02-27 PROCEDURE — 85025 COMPLETE CBC W/AUTO DIFF WBC: CPT

## 2021-02-27 PROCEDURE — 80053 COMPREHEN METABOLIC PANEL: CPT

## 2021-03-01 ENCOUNTER — TELEPHONE (OUTPATIENT)
Dept: ENDOSCOPY | Facility: HOSPITAL | Age: 61
End: 2021-03-01

## 2021-03-01 LAB — HCV AB SERPL QL IA: NEGATIVE

## 2021-03-02 ENCOUNTER — HOSPITAL ENCOUNTER (OUTPATIENT)
Dept: RADIOLOGY | Facility: HOSPITAL | Age: 61
Discharge: HOME | End: 2021-03-02
Attending: FAMILY MEDICINE
Payer: COMMERCIAL

## 2021-03-02 DIAGNOSIS — E04.1 THYROID NODULE: ICD-10-CM

## 2021-03-02 PROCEDURE — 76536 US SOFT TISSUE HEAD NECK THYROID: ICD-10-PCS | Mod: 26,,, | Performed by: RADIOLOGY

## 2021-03-02 PROCEDURE — 76536 US EXAM OF HEAD AND NECK: CPT | Mod: TC

## 2021-03-02 PROCEDURE — 76536 US EXAM OF HEAD AND NECK: CPT | Mod: 26,,, | Performed by: RADIOLOGY

## 2021-03-03 LAB — HIV 1+2 AB+HIV1 P24 AG SERPL QL IA: NEGATIVE

## 2021-03-08 ENCOUNTER — PATIENT MESSAGE (OUTPATIENT)
Dept: ADMINISTRATIVE | Facility: HOSPITAL | Age: 61
End: 2021-03-08

## 2021-03-15 DIAGNOSIS — I10 ESSENTIAL HYPERTENSION: Primary | ICD-10-CM

## 2021-03-17 ENCOUNTER — OFFICE VISIT (OUTPATIENT)
Dept: CARDIOLOGY | Facility: CLINIC | Age: 61
End: 2021-03-17
Payer: COMMERCIAL

## 2021-03-17 ENCOUNTER — CLINICAL SUPPORT (OUTPATIENT)
Dept: CARDIOLOGY | Facility: CLINIC | Age: 61
End: 2021-03-17
Payer: OTHER GOVERNMENT

## 2021-03-17 VITALS
HEART RATE: 58 BPM | OXYGEN SATURATION: 96 % | WEIGHT: 203.38 LBS | DIASTOLIC BLOOD PRESSURE: 82 MMHG | BODY MASS INDEX: 34.91 KG/M2 | SYSTOLIC BLOOD PRESSURE: 136 MMHG

## 2021-03-17 DIAGNOSIS — Z82.49 FAMILY HISTORY OF HEART DISEASE IN MALE FAMILY MEMBER BEFORE AGE 55: ICD-10-CM

## 2021-03-17 DIAGNOSIS — R07.89 OTHER CHEST PAIN: ICD-10-CM

## 2021-03-17 DIAGNOSIS — R00.2 PALPITATIONS: ICD-10-CM

## 2021-03-17 DIAGNOSIS — T46.6X5A STATIN MYOPATHY: ICD-10-CM

## 2021-03-17 DIAGNOSIS — I10 ESSENTIAL HYPERTENSION: ICD-10-CM

## 2021-03-17 DIAGNOSIS — R07.89 CHEST PRESSURE: Primary | ICD-10-CM

## 2021-03-17 DIAGNOSIS — G72.0 STATIN MYOPATHY: ICD-10-CM

## 2021-03-17 PROCEDURE — 99999 PR PBB SHADOW E&M-EST. PATIENT-LVL III: CPT | Mod: PBBFAC,,, | Performed by: INTERNAL MEDICINE

## 2021-03-17 PROCEDURE — 99204 PR OFFICE/OUTPT VISIT, NEW, LEVL IV, 45-59 MIN: ICD-10-PCS | Mod: 25,S$GLB,, | Performed by: INTERNAL MEDICINE

## 2021-03-17 PROCEDURE — 93000 ELECTROCARDIOGRAM COMPLETE: CPT | Mod: S$GLB,,, | Performed by: INTERNAL MEDICINE

## 2021-03-17 PROCEDURE — 93005 ELECTROCARDIOGRAM TRACING: CPT | Mod: PBBFAC,PO | Performed by: INTERNAL MEDICINE

## 2021-03-17 PROCEDURE — 99213 OFFICE O/P EST LOW 20 MIN: CPT | Mod: PBBFAC,PO | Performed by: INTERNAL MEDICINE

## 2021-03-17 PROCEDURE — 93000 EKG 12-LEAD: ICD-10-PCS | Mod: S$GLB,,, | Performed by: INTERNAL MEDICINE

## 2021-03-17 PROCEDURE — 99204 OFFICE O/P NEW MOD 45 MIN: CPT | Mod: 25,S$GLB,, | Performed by: INTERNAL MEDICINE

## 2021-03-17 PROCEDURE — 99999 PR PBB SHADOW E&M-EST. PATIENT-LVL III: ICD-10-PCS | Mod: PBBFAC,,, | Performed by: INTERNAL MEDICINE

## 2021-04-02 ENCOUNTER — PATIENT MESSAGE (OUTPATIENT)
Dept: CARDIOLOGY | Facility: CLINIC | Age: 61
End: 2021-04-02

## 2021-04-05 ENCOUNTER — TELEPHONE (OUTPATIENT)
Dept: CARDIOLOGY | Facility: CLINIC | Age: 61
End: 2021-04-05

## 2021-04-07 ENCOUNTER — HOSPITAL ENCOUNTER (OUTPATIENT)
Dept: CARDIOLOGY | Facility: HOSPITAL | Age: 61
Discharge: HOME | End: 2021-04-07
Attending: INTERNAL MEDICINE
Payer: COMMERCIAL

## 2021-04-07 VITALS — BODY MASS INDEX: 34.66 KG/M2 | WEIGHT: 203 LBS | HEIGHT: 64 IN

## 2021-04-07 VITALS
HEIGHT: 64 IN | WEIGHT: 203 LBS | BODY MASS INDEX: 34.66 KG/M2 | SYSTOLIC BLOOD PRESSURE: 136 MMHG | HEART RATE: 78 BPM | DIASTOLIC BLOOD PRESSURE: 82 MMHG

## 2021-04-07 DIAGNOSIS — R00.2 PALPITATIONS: ICD-10-CM

## 2021-04-07 DIAGNOSIS — R07.89 CHEST PRESSURE: ICD-10-CM

## 2021-04-07 LAB
AORTIC ROOT ANNULUS: 3.49 CM
AV INDEX (PROSTH): 0.6
AV MEAN GRADIENT: 2 MMHG
AV PEAK GRADIENT: 4 MMHG
AV VALVE AREA: 2.64 CM2
AV VELOCITY RATIO: 0.67
BSA FOR ECHO PROCEDURE: 2.04 M2
CV ECHO LV RWT: 0.52 CM
CV STRESS BASE HR: 78 BPM
DIASTOLIC BLOOD PRESSURE: 75 MMHG
DOP CALC AO PEAK VEL: 0.96 M/S
DOP CALC AO VTI: 22.29 CM
DOP CALC LVOT AREA: 4.4 CM2
DOP CALC LVOT DIAMETER: 2.36 CM
DOP CALC LVOT PEAK VEL: 0.64 M/S
DOP CALC LVOT STROKE VOLUME: 58.85 CM3
DOP CALC RVOT PEAK VEL: 0.65 M/S
DOP CALC RVOT VTI: 15.7 CM
DOP CALCLVOT PEAK VEL VTI: 13.46 CM
E WAVE DECELERATION TIME: 134.09 MSEC
E/A RATIO: 1.12
E/E' RATIO: 7.05 M/S
ECHO LV POSTERIOR WALL: 1.27 CM (ref 0.6–1.1)
EJECTION FRACTION: 55 %
FRACTIONAL SHORTENING: 41 % (ref 28–44)
INTERVENTRICULAR SEPTUM: 1.25 CM (ref 0.6–1.1)
IVRT: 105.61 MSEC
LA MAJOR: 4.19 CM
LA MINOR: 5.17 CM
LA WIDTH: 3.74 CM
LEFT ATRIUM SIZE: 3.83 CM
LEFT ATRIUM VOLUME INDEX: 28.6 ML/M2
LEFT ATRIUM VOLUME: 56.36 CM3
LEFT INTERNAL DIMENSION IN SYSTOLE: 2.87 CM (ref 2.1–4)
LEFT VENTRICLE DIASTOLIC VOLUME INDEX: 57.16 ML/M2
LEFT VENTRICLE DIASTOLIC VOLUME: 112.6 ML
LEFT VENTRICLE MASS INDEX: 123 G/M2
LEFT VENTRICLE SYSTOLIC VOLUME INDEX: 15.9 ML/M2
LEFT VENTRICLE SYSTOLIC VOLUME: 31.38 ML
LEFT VENTRICULAR INTERNAL DIMENSION IN DIASTOLE: 4.9 CM (ref 3.5–6)
LEFT VENTRICULAR MASS: 242.6 G
LV LATERAL E/E' RATIO: 5.58 M/S
LV SEPTAL E/E' RATIO: 9.57 M/S
MV A" WAVE DURATION": 9.13 MSEC
MV PEAK A VEL: 0.6 M/S
MV PEAK E VEL: 0.67 M/S
MV STENOSIS PRESSURE HALF TIME: 38.89 MS
MV VALVE AREA P 1/2 METHOD: 5.66 CM2
OHS CV CPX 1 MINUTE RECOVERY HEART RATE: 112 BPM
OHS CV CPX 85 PERCENT MAX PREDICTED HEART RATE MALE: 129
OHS CV CPX ESTIMATED METS: 5
OHS CV CPX MAX PREDICTED HEART RATE: 151
OHS CV CPX PATIENT IS FEMALE: 1
OHS CV CPX PATIENT IS MALE: 0
OHS CV CPX PEAK DIASTOLIC BLOOD PRESSURE: 89 MMHG
OHS CV CPX PEAK HEAR RATE: 129 BPM
OHS CV CPX PEAK RATE PRESSURE PRODUCT: NORMAL
OHS CV CPX PEAK SYSTOLIC BLOOD PRESSURE: 219 MMHG
OHS CV CPX PERCENT MAX PREDICTED HEART RATE ACHIEVED: 85
OHS CV CPX RATE PRESSURE PRODUCT PRESENTING: NORMAL
PULM VEIN S/D RATIO: 1.17
PV MEAN GRADIENT: 1 MMHG
PV PEAK D VEL: 0.41 M/S
PV PEAK S VEL: 0.48 M/S
PV PEAK VELOCITY: 0.79 CM/S
RA MAJOR: 3.81 CM
RA PRESSURE: 3 MMHG
RA WIDTH: 2.24 CM
RIGHT VENTRICULAR END-DIASTOLIC DIMENSION: 2.37 CM
SINUS: 3.68 CM
STJ: 3.82 CM
STRESS ECHO POST EXERCISE DUR MIN: 3 MINUTES
STRESS ECHO POST EXERCISE DUR SEC: 8 SECONDS
SYSTOLIC BLOOD PRESSURE: 153 MMHG
TDI LATERAL: 0.12 M/S
TDI SEPTAL: 0.07 M/S
TDI: 0.1 M/S
TRICUSPID ANNULAR PLANE SYSTOLIC EXCURSION: 2.21 CM

## 2021-04-07 PROCEDURE — 93225 XTRNL ECG REC<48 HRS REC: CPT

## 2021-04-07 PROCEDURE — 93018 EXERCISE STRESS - EKG (CUPID ONLY): ICD-10-PCS | Mod: ,,, | Performed by: INTERNAL MEDICINE

## 2021-04-07 PROCEDURE — 93018 CV STRESS TEST I&R ONLY: CPT | Mod: ,,, | Performed by: INTERNAL MEDICINE

## 2021-04-07 PROCEDURE — 93016 EXERCISE STRESS - EKG (CUPID ONLY): ICD-10-PCS | Mod: ,,, | Performed by: INTERNAL MEDICINE

## 2021-04-07 PROCEDURE — 93306 ECHO (CUPID ONLY): ICD-10-PCS | Mod: 26,,, | Performed by: INTERNAL MEDICINE

## 2021-04-07 PROCEDURE — 93306 TTE W/DOPPLER COMPLETE: CPT | Mod: 26,,, | Performed by: INTERNAL MEDICINE

## 2021-04-07 PROCEDURE — 93017 CV STRESS TEST TRACING ONLY: CPT

## 2021-04-07 PROCEDURE — 93306 TTE W/DOPPLER COMPLETE: CPT

## 2021-04-07 PROCEDURE — 93016 CV STRESS TEST SUPVJ ONLY: CPT | Mod: ,,, | Performed by: INTERNAL MEDICINE

## 2021-04-07 PROCEDURE — 93227 HOLTER MONITOR - 24 HOUR (CUPID ONLY): ICD-10-PCS | Mod: ,,, | Performed by: INTERNAL MEDICINE

## 2021-04-07 PROCEDURE — 93227 XTRNL ECG REC<48 HR R&I: CPT | Mod: ,,, | Performed by: INTERNAL MEDICINE

## 2021-04-08 ENCOUNTER — TELEPHONE (OUTPATIENT)
Dept: CARDIOLOGY | Facility: CLINIC | Age: 61
End: 2021-04-08

## 2021-04-08 LAB
OHS CV EVENT MONITOR DAY: 0
OHS CV HOLTER LENGTH DECIMAL HOURS: 24
OHS CV HOLTER LENGTH HOURS: 24
OHS CV HOLTER LENGTH MINUTES: 0

## 2021-04-09 ENCOUNTER — TELEPHONE (OUTPATIENT)
Dept: CARDIOLOGY | Facility: CLINIC | Age: 61
End: 2021-04-09

## 2021-04-22 ENCOUNTER — TELEPHONE (OUTPATIENT)
Dept: ENDOSCOPY | Facility: HOSPITAL | Age: 61
End: 2021-04-22

## 2021-04-29 ENCOUNTER — HOSPITAL ENCOUNTER (EMERGENCY)
Facility: HOSPITAL | Age: 61
Discharge: HOME | End: 2021-04-29
Attending: EMERGENCY MEDICINE
Payer: COMMERCIAL

## 2021-04-29 ENCOUNTER — PATIENT MESSAGE (OUTPATIENT)
Dept: RESEARCH | Facility: HOSPITAL | Age: 61
End: 2021-04-29

## 2021-04-29 VITALS
BODY MASS INDEX: 34.85 KG/M2 | SYSTOLIC BLOOD PRESSURE: 167 MMHG | DIASTOLIC BLOOD PRESSURE: 84 MMHG | TEMPERATURE: 98 F | OXYGEN SATURATION: 99 % | RESPIRATION RATE: 18 BRPM | HEART RATE: 87 BPM | WEIGHT: 203.06 LBS

## 2021-04-29 DIAGNOSIS — M54.9 BACK PAIN: ICD-10-CM

## 2021-04-29 DIAGNOSIS — S22.39XA: Primary | ICD-10-CM

## 2021-04-29 DIAGNOSIS — R07.81 RIB PAIN: ICD-10-CM

## 2021-04-29 LAB
ALBUMIN SERPL BCP-MCNC: 4 G/DL (ref 3.5–5.2)
ALP SERPL-CCNC: 70 U/L (ref 55–135)
ALT SERPL W/O P-5'-P-CCNC: 39 U/L (ref 10–44)
ANION GAP SERPL CALC-SCNC: 8 MMOL/L (ref 8–16)
AST SERPL-CCNC: 28 U/L (ref 10–40)
BASOPHILS # BLD AUTO: 0.02 K/UL (ref 0–0.2)
BASOPHILS NFR BLD: 0.4 % (ref 0–1.9)
BILIRUB SERPL-MCNC: 0.5 MG/DL (ref 0.1–1)
BUN SERPL-MCNC: 10 MG/DL (ref 8–23)
CALCIUM SERPL-MCNC: 8.9 MG/DL (ref 8.7–10.5)
CHLORIDE SERPL-SCNC: 105 MMOL/L (ref 95–110)
CO2 SERPL-SCNC: 26 MMOL/L (ref 23–29)
CREAT SERPL-MCNC: 0.8 MG/DL (ref 0.5–1.4)
DIFFERENTIAL METHOD: ABNORMAL
EOSINOPHIL # BLD AUTO: 0.1 K/UL (ref 0–0.5)
EOSINOPHIL NFR BLD: 1.7 % (ref 0–8)
ERYTHROCYTE [DISTWIDTH] IN BLOOD BY AUTOMATED COUNT: 13 % (ref 11.5–14.5)
EST. GFR  (AFRICAN AMERICAN): >60 ML/MIN/1.73 M^2
EST. GFR  (NON AFRICAN AMERICAN): >60 ML/MIN/1.73 M^2
GLUCOSE SERPL-MCNC: 109 MG/DL (ref 70–110)
HCT VFR BLD AUTO: 41.7 % (ref 37–48.5)
HGB BLD-MCNC: 13.2 G/DL (ref 12–16)
IMM GRANULOCYTES # BLD AUTO: 0.01 K/UL (ref 0–0.04)
IMM GRANULOCYTES NFR BLD AUTO: 0.2 % (ref 0–0.5)
INR PPP: 0.9 (ref 0.8–1.2)
LIPASE SERPL-CCNC: 36 U/L (ref 4–60)
LYMPHOCYTES # BLD AUTO: 1.4 K/UL (ref 1–4.8)
LYMPHOCYTES NFR BLD: 26.1 % (ref 18–48)
MCH RBC QN AUTO: 28.6 PG (ref 27–31)
MCHC RBC AUTO-ENTMCNC: 31.7 G/DL (ref 32–36)
MCV RBC AUTO: 91 FL (ref 82–98)
MONOCYTES # BLD AUTO: 0.5 K/UL (ref 0.3–1)
MONOCYTES NFR BLD: 8.5 % (ref 4–15)
NEUTROPHILS # BLD AUTO: 3.4 K/UL (ref 1.8–7.7)
NEUTROPHILS NFR BLD: 63.1 % (ref 38–73)
NRBC BLD-RTO: 0 /100 WBC
PLATELET # BLD AUTO: 248 K/UL (ref 150–450)
PMV BLD AUTO: 9.4 FL (ref 9.2–12.9)
POTASSIUM SERPL-SCNC: 4.2 MMOL/L (ref 3.5–5.1)
PROT SERPL-MCNC: 7.6 G/DL (ref 6–8.4)
PROTHROMBIN TIME: 10.3 SEC (ref 9–12.5)
RBC # BLD AUTO: 4.61 M/UL (ref 4–5.4)
SODIUM SERPL-SCNC: 139 MMOL/L (ref 136–145)
WBC # BLD AUTO: 5.41 K/UL (ref 3.9–12.7)

## 2021-04-29 PROCEDURE — 99285 EMERGENCY DEPT VISIT HI MDM: CPT | Mod: 25

## 2021-04-29 PROCEDURE — 85025 COMPLETE CBC W/AUTO DIFF WBC: CPT | Performed by: NURSE PRACTITIONER

## 2021-04-29 PROCEDURE — 80053 COMPREHEN METABOLIC PANEL: CPT | Performed by: NURSE PRACTITIONER

## 2021-04-29 PROCEDURE — 85610 PROTHROMBIN TIME: CPT | Performed by: NURSE PRACTITIONER

## 2021-04-29 PROCEDURE — 36000 PLACE NEEDLE IN VEIN: CPT

## 2021-04-29 PROCEDURE — 83690 ASSAY OF LIPASE: CPT | Performed by: NURSE PRACTITIONER

## 2021-04-29 RX ORDER — HYDROCODONE BITARTRATE AND ACETAMINOPHEN 5; 325 MG/1; MG/1
1 TABLET ORAL EVERY 6 HOURS PRN
Qty: 14 TABLET | Refills: 0 | Status: SHIPPED | OUTPATIENT
Start: 2021-04-29

## 2021-04-29 RX ORDER — ONDANSETRON 4 MG/1
4 TABLET, FILM COATED ORAL EVERY 8 HOURS PRN
Qty: 12 TABLET | Refills: 0 | Status: SHIPPED | OUTPATIENT
Start: 2021-04-29

## 2021-11-19 ENCOUNTER — PATIENT MESSAGE (OUTPATIENT)
Dept: ENDOSCOPY | Facility: HOSPITAL | Age: 61
End: 2021-11-19
Payer: OTHER GOVERNMENT

## 2022-03-16 DIAGNOSIS — Z12.31 OTHER SCREENING MAMMOGRAM: ICD-10-CM

## 2022-04-27 ENCOUNTER — PATIENT MESSAGE (OUTPATIENT)
Dept: ADMINISTRATIVE | Facility: HOSPITAL | Age: 62
End: 2022-04-27
Payer: OTHER GOVERNMENT

## 2022-05-04 DIAGNOSIS — I10 ESSENTIAL HYPERTENSION: ICD-10-CM

## 2022-07-27 ENCOUNTER — PATIENT MESSAGE (OUTPATIENT)
Dept: ADMINISTRATIVE | Facility: HOSPITAL | Age: 62
End: 2022-07-27
Payer: OTHER GOVERNMENT

## 2022-09-15 ENCOUNTER — PATIENT OUTREACH (OUTPATIENT)
Dept: ADMINISTRATIVE | Facility: HOSPITAL | Age: 62
End: 2022-09-15
Payer: OTHER GOVERNMENT

## 2022-09-15 NOTE — PROGRESS NOTES
Attempted to contact patient by phone for PCP visit, was able to speak to patient. Patient declined

## 2022-10-20 ENCOUNTER — PATIENT MESSAGE (OUTPATIENT)
Dept: ADMINISTRATIVE | Facility: HOSPITAL | Age: 62
End: 2022-10-20
Payer: OTHER GOVERNMENT

## 2022-11-08 LAB — HBA1C MFR BLD: 5.6 % (ref 4.2–5.8)

## 2023-01-25 ENCOUNTER — PATIENT MESSAGE (OUTPATIENT)
Dept: ADMINISTRATIVE | Facility: HOSPITAL | Age: 63
End: 2023-01-25
Payer: OTHER GOVERNMENT

## 2023-05-31 ENCOUNTER — PATIENT OUTREACH (OUTPATIENT)
Dept: ADMINISTRATIVE | Facility: HOSPITAL | Age: 63
End: 2023-05-31
Payer: OTHER GOVERNMENT

## 2023-05-31 NOTE — PROGRESS NOTES
Working [facility] HTN Report    Chart reviewed. Care Everywhere shows pt est care with new pcp, 11/07/23 at [facility].  Chart updated.

## 2025-01-31 ENCOUNTER — OFFICE VISIT (OUTPATIENT)
Dept: INTERNAL MEDICINE | Facility: CLINIC | Age: 65
End: 2025-01-31
Payer: MEDICARE

## 2025-01-31 VITALS
WEIGHT: 182.31 LBS | HEIGHT: 64 IN | BODY MASS INDEX: 31.12 KG/M2 | TEMPERATURE: 98 F | SYSTOLIC BLOOD PRESSURE: 122 MMHG | HEART RATE: 81 BPM | DIASTOLIC BLOOD PRESSURE: 78 MMHG

## 2025-01-31 DIAGNOSIS — Z86.39 HISTORY OF ELEVATED GLUCOSE: ICD-10-CM

## 2025-01-31 DIAGNOSIS — T46.6X5A STATIN MYOPATHY: ICD-10-CM

## 2025-01-31 DIAGNOSIS — R21 RASH: Primary | ICD-10-CM

## 2025-01-31 DIAGNOSIS — L03.313 CELLULITIS OF CHEST WALL: ICD-10-CM

## 2025-01-31 DIAGNOSIS — E66.9 OBESITY (BMI 30-39.9): ICD-10-CM

## 2025-01-31 DIAGNOSIS — Z82.49 FAMILY HISTORY OF HEART DISEASE IN MALE FAMILY MEMBER BEFORE AGE 55: ICD-10-CM

## 2025-01-31 DIAGNOSIS — G72.0 STATIN MYOPATHY: ICD-10-CM

## 2025-01-31 DIAGNOSIS — Z88.1 ALLERGY TO ANTIBIOTIC: ICD-10-CM

## 2025-01-31 PROBLEM — E78.5 HYPERLIPIDEMIA: Status: RESOLVED | Noted: 2020-01-30 | Resolved: 2025-01-31

## 2025-01-31 PROBLEM — R22.2 LOCALIZED SWELLING, MASS AND LUMP, TRUNK: Status: ACTIVE | Noted: 2025-01-31

## 2025-01-31 PROCEDURE — 99213 OFFICE O/P EST LOW 20 MIN: CPT | Mod: PBBFAC,PO | Performed by: INTERNAL MEDICINE

## 2025-01-31 PROCEDURE — 99999 PR PBB SHADOW E&M-EST. PATIENT-LVL III: CPT | Mod: PBBFAC,,, | Performed by: INTERNAL MEDICINE

## 2025-01-31 PROCEDURE — 99214 OFFICE O/P EST MOD 30 MIN: CPT | Mod: S$PBB,,, | Performed by: INTERNAL MEDICINE

## 2025-01-31 RX ORDER — ASCORBIC ACID 500 MG
500 TABLET ORAL DAILY
COMMUNITY

## 2025-01-31 RX ORDER — IBUPROFEN 800 MG/1
800 TABLET ORAL EVERY 6 HOURS PRN
COMMUNITY

## 2025-01-31 RX ORDER — MULTIVITAMIN
1 TABLET ORAL DAILY
COMMUNITY

## 2025-01-31 RX ORDER — AMOXICILLIN AND CLAVULANATE POTASSIUM 875; 125 MG/1; MG/1
1 TABLET, FILM COATED ORAL EVERY 12 HOURS
Qty: 14 TABLET | Refills: 0 | Status: SHIPPED | OUTPATIENT
Start: 2025-01-31 | End: 2025-02-07

## 2025-01-31 NOTE — PROGRESS NOTES
"Chief Complaint  Chief Complaint   Patient presents with    Rash     Left side near bra line        HPI  Lenora Santiago is a 64 y.o. female here today for the following: The primary encounter diagnosis was Rash. Diagnoses of Cellulitis of chest wall, Allergy to antibiotic, History of elevated glucose, Obesity (BMI 30-39.9), Statin myopathy, and Family history of heart disease in male family member before age 55 were also pertinent to this visit.  Patient reported not experiencing significant side effects, except as noted in HPI if applicable.    January 31, 2025.  Patient reported here for rash on left side near bra line.  BP was at goal.  A1c was 5.6 November 2022.  MD counseled patient and answered questions.  Rash on left torso near bra line, cellulitis, allergy to antibiotic, history of elevated glucose, statin myopathy and family history of heart disease.  Patient reported rash on left torso near bra line that began about 1 week ago and is stable.  Rash is erythematous, tender, mildly itchy, however no bleeding, pus or drainage and no area of fluctuation that could be lanced.  MD counseled patient and answered questions.  Patient agreed to antibiotic.  MD counseled patient and answered questions.     PHQ-2 (Reflex to PHQ-9) Interpretation:  Over the last two weeks how often have you been bothered by little interest or pleasure in doing things: 0  Over the last two weeks how often have you been bothered by feeling down, depressed or hopeless: 0  PHQ-2 Total Score: 0       Review of patient's allergies indicates:   Allergen Reactions    Clindamycin Rash    Atorvastatin      Muscle weakness    Rosuvastatin      Vague "didn't like how I felt while taking"       MEDS:  Current Outpatient Medications   Medication Instructions    amoxicillin-clavulanate 875-125mg (AUGMENTIN) 875-125 mg per tablet 1 tablet, Oral, Every 12 hours    ascorbic acid (vitamin C) (VITAMIN C) 500 mg, Daily    ibuprofen (ADVIL,MOTRIN) 800 " "mg, Every 6 hours PRN    multivitamin (THERAGRAN) per tablet 1 tablet, Daily        Past Medical History:   Diagnosis Date    Benign positional vertigo 12/2020       Past Surgical History:   Procedure Laterality Date    APPENDECTOMY         Family History   Problem Relation Name Age of Onset    Breast cancer Sister      Breast cancer Paternal Aunt      Cervical cancer Neg Hx      Endometrial cancer Neg Hx      Vaginal cancer Neg Hx      Ovarian cancer Neg Hx         Social History     Substance and Sexual Activity   Drug Use No       Review of Systems   Review of Systems   Constitutional:         As per HPI, otherwise negative.    HENT:          As per HPI, otherwise negative.    Eyes:         As per HPI, otherwise negative.    Respiratory:          As per HPI, otherwise negative.    Cardiovascular:         As per HPI, otherwise negative.    Gastrointestinal:         As per HPI, otherwise negative.    Genitourinary:         As per HPI, otherwise negative.    Musculoskeletal:         As per HPI, otherwise negative.    Skin:         As per HPI, otherwise negative.    Neurological:         As per HPI, otherwise negative.    Endo/Heme/Allergies:         As per HPI, otherwise negative.    Psychiatric/Behavioral:          As per HPI, otherwise negative.        Objective   Vitals:    01/31/25 1309   BP: 122/78   Patient Position: Sitting   Pulse: 81   Temp: 97.9 °F (36.6 °C)   TempSrc: Tympanic   Weight: 82.7 kg (182 lb 5.1 oz)   Height: 5' 4" (1.626 m)       Body mass index is 31.3 kg/m².    Physical Exam  HENT:      Head: Normocephalic.   Eyes:      Extraocular Movements: Extraocular movements intact.   Cardiovascular:      Rate and Rhythm: Regular rhythm.   Pulmonary:      Effort: No respiratory distress.   Abdominal:      Palpations: Abdomen is soft.   Musculoskeletal:      Cervical back: Neck supple.   Skin:     General: Skin is warm.      Findings: Rash present.   Neurological:      Mental Status: She is alert. " Mental status is at baseline.         Recent labs:   No results found for this or any previous visit (from the past 24 hours).    Assessment / Plan  Lenora was seen today for rash.    Diagnoses and all orders for this visit:    Rash  -     amoxicillin-clavulanate 875-125mg (AUGMENTIN) 875-125 mg per tablet; Take 1 tablet by mouth every 12 (twelve) hours. for 7 days    Cellulitis of chest wall    Allergy to antibiotic  -     amoxicillin-clavulanate 875-125mg (AUGMENTIN) 875-125 mg per tablet; Take 1 tablet by mouth every 12 (twelve) hours. for 7 days    History of elevated glucose  -     amoxicillin-clavulanate 875-125mg (AUGMENTIN) 875-125 mg per tablet; Take 1 tablet by mouth every 12 (twelve) hours. for 7 days    Obesity (BMI 30-39.9)    Statin myopathy    Family history of heart disease in male family member before age 55

## 2025-02-05 DIAGNOSIS — Z78.0 MENOPAUSE: ICD-10-CM

## 2025-03-13 ENCOUNTER — OFFICE VISIT (OUTPATIENT)
Dept: INTERNAL MEDICINE | Facility: CLINIC | Age: 65
End: 2025-03-13
Payer: MEDICARE

## 2025-03-13 VITALS
SYSTOLIC BLOOD PRESSURE: 120 MMHG | OXYGEN SATURATION: 99 % | BODY MASS INDEX: 31.03 KG/M2 | WEIGHT: 180.75 LBS | HEART RATE: 92 BPM | TEMPERATURE: 97 F | DIASTOLIC BLOOD PRESSURE: 74 MMHG

## 2025-03-13 DIAGNOSIS — L03.313 CELLULITIS OF CHEST WALL: Primary | ICD-10-CM

## 2025-03-13 PROCEDURE — 99999 PR PBB SHADOW E&M-EST. PATIENT-LVL III: CPT | Mod: PBBFAC,,, | Performed by: INTERNAL MEDICINE

## 2025-03-13 PROCEDURE — 99213 OFFICE O/P EST LOW 20 MIN: CPT | Mod: S$PBB,,, | Performed by: INTERNAL MEDICINE

## 2025-03-13 PROCEDURE — 99213 OFFICE O/P EST LOW 20 MIN: CPT | Mod: PBBFAC,PO | Performed by: INTERNAL MEDICINE

## 2025-03-13 RX ORDER — SULFAMETHOXAZOLE AND TRIMETHOPRIM 800; 160 MG/1; MG/1
1 TABLET ORAL 2 TIMES DAILY
Qty: 20 TABLET | Refills: 0 | Status: SHIPPED | OUTPATIENT
Start: 2025-03-13 | End: 2025-03-23

## 2025-03-13 NOTE — PROGRESS NOTES
Subjective:       Patient ID: Lenora Santiago is a 64 y.o. female.    Chief Complaint: Cyst (Nausea, L breast pain )      HPI:  History of Present Illness          Patient is a 64-year-old female presenting today with a tender red nodule under her left axilla the area the anterior axillary line.  She states she had this previously a few weeks ago saw Dr. Vanegas again he treated her with Augmentin and it improved but now it has come back.  She notes no drainage.  She has not noticed it to be squishy.  It is tender to touch.    Review of Systems    Objective:   /74   Pulse 92   Temp 97.2 °F (36.2 °C) (Tympanic)   Wt 82 kg (180 lb 12.4 oz)   SpO2 99%   BMI 31.03 kg/m²      Physical Exam  Constitutional:       General: She is not in acute distress.     Appearance: Normal appearance. She is well-developed. She is not ill-appearing.   HENT:      Head: Normocephalic and atraumatic.   Eyes:      Extraocular Movements: Extraocular movements intact.   Pulmonary:      Effort: Pulmonary effort is normal. No respiratory distress.   Musculoskeletal:      Cervical back: Normal range of motion.   Skin:     Coloration: Skin is not jaundiced or pale.      Findings: No rash.      Comments: Examination of the skin of the left flank reveals a ordered 0.5 cm erythematous nodule with some mild surrounding erythema in the anterior axillary line at the base of the left breast.  There is no drainage.  There is no fluctuance.  It is very tender.  He has a with chaperoned by  Bean Calvin MA   Neurological:      General: No focal deficit present.      Mental Status: She is alert and oriented to person, place, and time.      Cranial Nerves: No cranial nerve deficit.   Psychiatric:         Behavior: Behavior normal.         Thought Content: Thought content normal.         No visits with results within 2 Week(s) from this visit.   Latest known visit with results is:   Patient Outreach on 05/31/2023   Component Date Value     Hemoglobin A1C 11/08/2022 5.6        Assessment:       1. Cellulitis of chest wall        Plan:   No problem-specific Assessment & Plan notes found for this encounter.    Lenora was seen today for cyst.    Diagnoses and all orders for this visit:    Cellulitis of chest wall  -     sulfamethoxazole-trimethoprim 800-160mg (BACTRIM DS) 800-160 mg Tab; Take 1 tablet by mouth 2 (two) times daily. for 10 days      I believe she likely had antibiotic failure with the Augmentin.  She had partial response and then recurrence.  We are going to go ahead and changed to Bactrim DS 1 p.o. b.i.d. times 10 days.  She will use warm compresses.  We will set up follow-up with Dr. Yepez in in about 2 weeks.  She is overdue for mammogram other screenings.  She acknowledges that she needs to get updated on those issues.  Given the fact that this area is involving the breast I think it is very important that she have a clinical breast exam and mammogram after the abscess/cellulitis heals                  This note was generated with the assistance of ambient listening technology. Verbal consent was obtained by the patient and accompanying visitor(s) for the recording of patient appointment to facilitate this note

## 2025-04-02 ENCOUNTER — OFFICE VISIT (OUTPATIENT)
Dept: INTERNAL MEDICINE | Facility: CLINIC | Age: 65
End: 2025-04-02
Payer: MEDICARE

## 2025-04-02 VITALS
HEIGHT: 64 IN | HEART RATE: 69 BPM | BODY MASS INDEX: 31.39 KG/M2 | SYSTOLIC BLOOD PRESSURE: 126 MMHG | WEIGHT: 183.88 LBS | TEMPERATURE: 97 F | OXYGEN SATURATION: 97 % | DIASTOLIC BLOOD PRESSURE: 78 MMHG

## 2025-04-02 DIAGNOSIS — N60.82 CYST OF SKIN OF BREAST, LEFT: ICD-10-CM

## 2025-04-02 DIAGNOSIS — G72.0 STATIN MYOPATHY: ICD-10-CM

## 2025-04-02 DIAGNOSIS — Z88.1 ALLERGY TO MULTIPLE ANTIBIOTICS: ICD-10-CM

## 2025-04-02 DIAGNOSIS — Z12.31 ENCOUNTER FOR SCREENING MAMMOGRAM FOR BREAST CANCER: ICD-10-CM

## 2025-04-02 DIAGNOSIS — T46.6X5A STATIN MYOPATHY: ICD-10-CM

## 2025-04-02 DIAGNOSIS — R22.2 LOCALIZED SWELLING, MASS AND LUMP, TRUNK: ICD-10-CM

## 2025-04-02 DIAGNOSIS — Z00.01 ENCOUNTER FOR MEDICARE ANNUAL EXAMINATION WITH ABNORMAL FINDINGS: ICD-10-CM

## 2025-04-02 DIAGNOSIS — L03.313 CELLULITIS OF CHEST WALL: Primary | ICD-10-CM

## 2025-04-02 PROCEDURE — 99213 OFFICE O/P EST LOW 20 MIN: CPT | Mod: PBBFAC,PO | Performed by: INTERNAL MEDICINE

## 2025-04-02 PROCEDURE — 99999 PR PBB SHADOW E&M-EST. PATIENT-LVL III: CPT | Mod: PBBFAC,,, | Performed by: INTERNAL MEDICINE

## 2025-04-02 NOTE — PROGRESS NOTES
Chief Complaint  Chief Complaint   Patient presents with    Breast Pain     Would like mammo     Follow-up     2 week f/u         HPI  Lenora Santiago is a 64 y.o. female here today for the following: The primary encounter diagnosis was Cellulitis of chest wall. Diagnoses of Cyst of skin of breast, left, Allergy to multiple antibiotics, Localized swelling, mass and lump, trunk, Statin myopathy, Encounter for screening mammogram for breast cancer, and Encounter for Medicare annual examination with abnormal findings were also pertinent to this visit.  Patient reported not experiencing significant side effects, except as noted in HPI if applicable.    Patient joked with MD on April 2025 that with technology improving at this rate soon we will be on [place].  Patient reported that her  passed away from sarcoma and was originally diagnosed with cancer 46 years ago and then it came back.    April 2, 2025.  Addendum April 2, 2025 patient let us know Dermatology would not be until July and would like to have referral to General surgery.  Patient reported here for follow up.  BP was at goal. MD counseled patient and answered questions.   MD obtained and reviewed records and discuss results with patient.  A1c was 5.6 November 2022.  MD counseled patient and answered questions.  Rash on left torso near bra line, cellulitis, cyst of skin of left breast,allergy to antibiotic, history of elevated glucose, statin myopathy and family history of heart disease.  Patient reported rash on left torso near bra line that began about January 23, 2025 and saw MD about January 31, 2025 and took and tolerated Augmentin which helped resolved however it recurred and then patient saw Dr. Patton March 13, 2025 and patient took and tolerated Bactrim and improved but did not go completely away and after completing Bactrim began getting redder and bump getting larger and patient is stable.  Rash on left torso/left breast is erythematous,  "mildly tender, mildly itchy, however no bleeding, pus or drainage and no area of fluctuation that could be lanced.  Patient reported prefers Dermatology as they removed a similar cyst on her back previously.  MD counseled patient and answered questions.  Patient agreed to referral to Dermatology and if Dermatology says that they would prefer General surgery to evaluate and treated then patient will contact clinic and we will switch to General surgery. Addendum April 2, 2025 patient let us know Dermatology would not be until July and would like to have referral to General surgery.   Breast cancer screening.  Patient reported wants mammogram.  MD consult patient and answered questions.  MD ordered mammogram.  MD counseled patient and answered questions.    January 31, 2025.  Patient reported here for rash on left side near bra line.  BP was at goal.  A1c was 5.6 November 2022.  MD counseled patient and answered questions.  Rash on left torso near bra line, cellulitis, allergy to antibiotic, history of elevated glucose, statin myopathy and family history of heart disease.  Patient reported rash on left torso near bra line that began about 1 week ago and is stable.  Rash is erythematous, tender, mildly itchy, however no bleeding, pus or drainage and no area of fluctuation that could be lanced.  MD counseled patient and answered questions.  Patient agreed to antibiotic.  MD counseled patient and answered questions.     PHQ-2 (Reflex to PHQ-9) Interpretation:  Over the last two weeks how often have you been bothered by little interest or pleasure in doing things: 0  Over the last two weeks how often have you been bothered by feeling down, depressed or hopeless: 0  PHQ-2 Total Score: 0       Review of patient's allergies indicates:   Allergen Reactions    Clindamycin Rash    Atorvastatin      Muscle weakness    Rosuvastatin      Vague "didn't like how I felt while taking"       MEDS:  Current Outpatient Medications " "  Medication Instructions    ascorbic acid (vitamin C) (VITAMIN C) 500 mg, Daily    ibuprofen (ADVIL,MOTRIN) 800 mg, Every 6 hours PRN    multivitamin (THERAGRAN) per tablet 1 tablet, Daily        Past Medical History:   Diagnosis Date    Benign positional vertigo 12/2020       Past Surgical History:   Procedure Laterality Date    APPENDECTOMY         Family History   Problem Relation Name Age of Onset    Breast cancer Sister      Breast cancer Paternal Aunt      Cervical cancer Neg Hx      Endometrial cancer Neg Hx      Vaginal cancer Neg Hx      Ovarian cancer Neg Hx         Social History     Substance and Sexual Activity   Drug Use No       Review of Systems   Review of Systems   Constitutional:         As per HPI, otherwise negative.    HENT:          As per HPI, otherwise negative.    Eyes:         As per HPI, otherwise negative.    Respiratory:          As per HPI, otherwise negative.    Cardiovascular:         As per HPI, otherwise negative.    Gastrointestinal:         As per HPI, otherwise negative.    Genitourinary:         As per HPI, otherwise negative.    Musculoskeletal:         As per HPI, otherwise negative.    Skin:         As per HPI, otherwise negative.    Neurological:         As per HPI, otherwise negative.    Endo/Heme/Allergies:         As per HPI, otherwise negative.    Psychiatric/Behavioral:          As per HPI, otherwise negative.        Objective   Vitals:    04/02/25 1413   BP: 126/78   Patient Position: Sitting   Pulse: 69   Temp: 96.8 °F (36 °C)   TempSrc: Tympanic   SpO2: 97%   Weight: 83.4 kg (183 lb 13.8 oz)   Height: 5' 4" (1.626 m)       Body mass index is 31.56 kg/m².    Physical Exam  HENT:      Head: Normocephalic.   Eyes:      Extraocular Movements: Extraocular movements intact.   Cardiovascular:      Rate and Rhythm: Regular rhythm.   Pulmonary:      Effort: No respiratory distress.   Abdominal:      Palpations: Abdomen is soft.   Musculoskeletal:      Cervical back: Neck " supple.   Skin:     General: Skin is warm.      Findings: Rash present.   Neurological:      Mental Status: She is alert. Mental status is at baseline.         Recent labs:   No results found for this or any previous visit (from the past 24 hours).    Assessment / Plan  Lenora was seen today for breast pain and follow-up.    Diagnoses and all orders for this visit:    Cellulitis of chest wall  -     Ambulatory referral/consult to Dermatology; Future  -     Ambulatory referral/consult to General Surgery; Future    Cyst of skin of breast, left  -     Ambulatory referral/consult to Dermatology; Future  -     Ambulatory referral/consult to General Surgery; Future    Allergy to multiple antibiotics    Localized swelling, mass and lump, trunk  -     Ambulatory referral/consult to General Surgery; Future    Statin myopathy    Encounter for screening mammogram for breast cancer  -     Mammo Digital Screening Bilat w/ Reji (XPD); Future    Encounter for Medicare annual examination with abnormal findings  -     Mammo Digital Screening Bilat w/ Reji (XPD); Future

## 2025-04-04 ENCOUNTER — TELEPHONE (OUTPATIENT)
Dept: INTERNAL MEDICINE | Facility: CLINIC | Age: 65
End: 2025-04-04
Payer: MEDICARE

## 2025-04-04 ENCOUNTER — HOSPITAL ENCOUNTER (OUTPATIENT)
Dept: RADIOLOGY | Facility: HOSPITAL | Age: 65
Discharge: HOME | End: 2025-04-04
Attending: INTERNAL MEDICINE
Payer: MEDICARE

## 2025-04-04 ENCOUNTER — RESULTS FOLLOW-UP (OUTPATIENT)
Dept: INTERNAL MEDICINE | Facility: CLINIC | Age: 65
End: 2025-04-04

## 2025-04-04 VITALS — BODY MASS INDEX: 31.39 KG/M2 | HEIGHT: 64 IN | WEIGHT: 183.88 LBS

## 2025-04-04 DIAGNOSIS — Z00.01 ENCOUNTER FOR MEDICARE ANNUAL EXAMINATION WITH ABNORMAL FINDINGS: ICD-10-CM

## 2025-04-04 DIAGNOSIS — Z12.31 ENCOUNTER FOR SCREENING MAMMOGRAM FOR BREAST CANCER: ICD-10-CM

## 2025-04-04 PROCEDURE — 77063 BREAST TOMOSYNTHESIS BI: CPT | Mod: 26,,, | Performed by: RADIOLOGY

## 2025-04-04 PROCEDURE — 77067 SCR MAMMO BI INCL CAD: CPT | Mod: TC

## 2025-04-04 PROCEDURE — 77067 SCR MAMMO BI INCL CAD: CPT | Mod: 26,,, | Performed by: RADIOLOGY

## 2025-04-04 NOTE — TELEPHONE ENCOUNTER
----- Message from Gretchen sent at 4/4/2025  4:20 PM CDT -----  Contact: Lenora  .Type:  Patient Returning CallWho Called:Eamon Left Message for Patient:nurseDoes the patient know what this is regarding?:noWould the patient rather a call back or a response via [facility]? Call [facility] Call Back Number:.[phone] Information: [email or web address]

## 2025-04-07 ENCOUNTER — RESULTS FOLLOW-UP (OUTPATIENT)
Dept: INTERNAL MEDICINE | Facility: CLINIC | Age: 65
End: 2025-04-07

## 2025-04-24 ENCOUNTER — TELEPHONE (OUTPATIENT)
Dept: SURGERY | Facility: CLINIC | Age: 65
End: 2025-04-24
Payer: MEDICARE

## 2025-04-24 NOTE — TELEPHONE ENCOUNTER
----- Message from Emiliana sent at 4/24/2025 12:29 PM CDT -----  Type :  Needs Medical AdviceWho Called: ptSymptoms (please be specific): Cellulitis of chest wall [L03.313]Cyst of skin of breast, left [N60.82]Localized swelling, mass and lump, trunk  How long has patient had these symptoms:  Cellulitis of chest wall [L03.313]Cyst of skin of breast, left [N60.82]Localized swelling, mass and lump, trunk Pharmacy name and phone #:  noWould the patient rather a call back or a response via [facility]? callBest Call Back Number: [phone] Information: appt

## 2025-04-25 ENCOUNTER — OFFICE VISIT (OUTPATIENT)
Dept: SURGERY | Facility: CLINIC | Age: 65
End: 2025-04-25
Payer: MEDICARE

## 2025-04-25 VITALS
DIASTOLIC BLOOD PRESSURE: 82 MMHG | SYSTOLIC BLOOD PRESSURE: 137 MMHG | HEART RATE: 81 BPM | HEIGHT: 64 IN | BODY MASS INDEX: 31.24 KG/M2 | WEIGHT: 183 LBS

## 2025-04-25 DIAGNOSIS — L03.313 CELLULITIS OF CHEST WALL: ICD-10-CM

## 2025-04-25 DIAGNOSIS — N60.82 CYST OF SKIN OF BREAST, LEFT: ICD-10-CM

## 2025-04-25 DIAGNOSIS — R22.2 LOCALIZED SWELLING, MASS AND LUMP, TRUNK: ICD-10-CM

## 2025-04-25 DIAGNOSIS — L02.213 ABSCESS OF CHEST WALL: Primary | ICD-10-CM

## 2025-04-25 PROCEDURE — 99999 PR PBB SHADOW E&M-EST. PATIENT-LVL III: CPT | Mod: PBBFAC,,, | Performed by: SURGERY

## 2025-04-25 PROCEDURE — 99213 OFFICE O/P EST LOW 20 MIN: CPT | Mod: PBBFAC | Performed by: SURGERY

## 2025-04-25 NOTE — PATIENT INSTRUCTIONS
In the tub and get the area wet.      Prior small amount of antibiotic ointment and a Band-Aid twice a day.      Please call for any increasing pain, redness, swelling or drainage    Our office phone numbers are  [phone] and

## 2025-04-25 NOTE — PROGRESS NOTES
"Patient ID: Lenora Santiago is a 64 y.o. female.    Chief Complaint:  Left chest wall abscess  HPI:  64-year-old female who developed redness and swelling along the lateral aspect of her chest walls.  This improved slightly but there is still an area of fullness and redness.  She presents now for evaluation.  Patient states that she has an abscess and she feels drainage is needed likely    Review of Systems   Skin:         Areas of redness and drainage along the lateral chest wall     Current Medications[1]    Review of patient's allergies indicates:   Allergen Reactions    Clindamycin Rash    Atorvastatin      Muscle weakness    Rosuvastatin      Vague "didn't like how I felt while taking"       Past Medical History:   Diagnosis Date    Benign positional vertigo 12/2020       Past Surgical History:   Procedure Laterality Date    APPENDECTOMY         Social History[2]    Vitals:    04/25/25 1526   BP: 137/82   Pulse: 81       Physical Exam  Constitutional:       Appearance: She is obese.   Skin:     Comments: That has a 2 cm oval area of erythema with a small nodule within it, left chest wall       Assessment & Plan:  Left chest wall abscess.      Incision and drainage.  The wound would be left open to heal by secondary intention.  Wound care would involve cleansing with soap and water antibiotic ointment and a Band-Aid.      Follow up in several weeks         [1]   Current Outpatient Medications   Medication Sig Dispense Refill    ascorbic acid, vitamin C, (VITAMIN C) 500 MG tablet Take 500 mg by mouth once daily.      ibuprofen (ADVIL,MOTRIN) 800 MG tablet Take 800 mg by mouth every 6 (six) hours as needed for Pain.      multivitamin (THERAGRAN) per tablet Take 1 tablet by mouth once daily.       No current facility-administered medications for this visit.   [2]   Social History  Socioeconomic History    Marital status:    Tobacco Use    Smoking status: Never     Passive exposure: Past    Smokeless " tobacco: Never   Substance and Sexual Activity    Alcohol use: No    Drug use: No    Sexual activity: Never     Birth control/protection: None

## 2025-04-28 ENCOUNTER — TELEPHONE (OUTPATIENT)
Dept: SURGERY | Facility: CLINIC | Age: 65
End: 2025-04-28
Payer: MEDICARE

## 2025-04-28 NOTE — TELEPHONE ENCOUNTER
Contacted patient to let her know bruising is normal after a procedure. I asked the patient if she had any signs of redness, pain, or present fevers and the patient expressed she had not had any of those symptoms. The patient asked about her post op appointment which I scheduled for her on the phone. The patient expressed understanding.     ----- Message from Roro Interiano PA-C sent at 4/28/2025  3:09 PM CDT -----  Contact: Lenora  Yes this is normal. If it gets worse or associated with severe/worsening pain, redness, warmth, fevers, she should let us know.  ----- Message -----  From: Chrissie Pelletier  Sent: 4/28/2025  12:42 PM CDT  To: [facility] Staff    Type:  Appointment RequestCaller is requesting a appointment. Name of Caller: Lenora When does caller want appointment? 2 weekSymptoms/Reason for Visit: Follow upWould the patient rather a call back or a response via [facility]? Call back Best Call Back Number: [phone] Information: pt has bruising around the area and wants to know if that is normal.

## 2025-05-06 ENCOUNTER — NURSE TRIAGE (OUTPATIENT)
Dept: ADMINISTRATIVE | Facility: CLINIC | Age: 65
End: 2025-05-06
Payer: MEDICARE

## 2025-05-06 ENCOUNTER — TELEPHONE (OUTPATIENT)
Dept: SURGERY | Facility: CLINIC | Age: 65
End: 2025-05-06
Payer: MEDICARE

## 2025-05-06 ENCOUNTER — OFFICE VISIT (OUTPATIENT)
Dept: URGENT CARE | Facility: CLINIC | Age: 65
End: 2025-05-06
Payer: MEDICARE

## 2025-05-06 VITALS
SYSTOLIC BLOOD PRESSURE: 122 MMHG | HEART RATE: 75 BPM | HEIGHT: 64 IN | RESPIRATION RATE: 16 BRPM | WEIGHT: 182.63 LBS | BODY MASS INDEX: 31.18 KG/M2 | DIASTOLIC BLOOD PRESSURE: 70 MMHG | OXYGEN SATURATION: 100 % | TEMPERATURE: 98 F

## 2025-05-06 DIAGNOSIS — L02.91 ABSCESS: Primary | ICD-10-CM

## 2025-05-06 PROCEDURE — 99213 OFFICE O/P EST LOW 20 MIN: CPT | Mod: S$GLB,,, | Performed by: FAMILY MEDICINE

## 2025-05-06 RX ORDER — MUPIROCIN 20 MG/G
OINTMENT TOPICAL
Status: COMPLETED | OUTPATIENT
Start: 2025-05-06 | End: 2025-05-06

## 2025-05-06 RX ADMIN — MUPIROCIN: 20 OINTMENT TOPICAL at 02:05

## 2025-05-06 NOTE — TELEPHONE ENCOUNTER
Returned call to patient, she stated she has some drainage coming from her incision, she also stated that it does not have an odor but she is concerned. The patient was advised that there is not a provider in the office today. The patient was advised a provider will be back in the office tomorrow, the patient was scheduled to see Roro GUERRERO) tomorrow for wound check, the patient was also advised if anything changes today she may be seen in one of our urgent urgent care facilities, the patient verbalized understanding of appointment date and time.

## 2025-05-06 NOTE — TELEPHONE ENCOUNTER
Good afternoon, I have contacted the patient an appointment is now setup tomorrow for wound check.  Thank you,  Mallory

## 2025-05-06 NOTE — TELEPHONE ENCOUNTER
Speaking with patient, she is calling to speak with her surgeon's office. States she has more drainage today. No odor, no fever but just does not look right. She wants someone to look at it. Also is having more pain around the site. Attempted to assist patient to take photo, states she cannot. Advised I could send message to surgeon office to have someone call her. Verb understanding.   Reason for Disposition   Incision on the face gaping open and length of opening > 1/4 inch (6 mm), and over 2 days since wound re-opened    Additional Information   Negative: Major abdominal surgical incision and wound gaping open with visible internal organs   Negative: Sounds like a life-threatening emergency to the triager    Protocols used: Post-Op Incision Symptoms and [id number]

## 2025-05-06 NOTE — PATIENT INSTRUCTIONS
Keep wound area clean and dry. Cover with 2x4 bandaid  Keep surgery clinic follow up visit tomorrow

## 2025-05-06 NOTE — PROGRESS NOTES
"Subjective:      Patient ID: Lenora Santiago is a 64 y.o. female.    Vitals:  height is 5' 4" (1.626 m) and weight is 82.9 kg (182 lb 10.4 oz). Her oral temperature is 97.9 °F (36.6 °C). Her blood pressure is 122/70 and her pulse is 75. Her respiration is 16 and oxygen saturation is 100%.     Chief Complaint: Abscess    Left chest wall abscess s/p I&D 4/25 at surgery clinic. Following instruction of wound care, applying neosporin ointment and new bandage twice a day. Worried about infection seeing white layer, wants checked.   Surgery follow up appointment tomorrow    Abscess  Chronicity:  NewProgression Since Onset: gradually worsening  Location:  Shoulder/arm  Associated Symptoms: no fever, no chills, no sweats  Characteristics: draining, itching, painful, redness and bruising    Characteristics: no dryness, no scaling, no peeling, no swelling and no blistering    Pain Scale:  3/10  Treatments Tried:  Draining/squeezing  Relieved by:  Nothing  Worsened by:  Nothing      Constitution: Negative for chills and fever.   Skin:  Positive for abscess.      Objective:     Physical Exam   Constitutional: She is oriented to person, place, and time.  Non-toxic appearance. She does not appear ill.   HENT:   Head: Normocephalic and atraumatic.   Cardiovascular: Normal rate.   Pulmonary/Chest: Effort normal.   Abdominal: Normal appearance.   Neurological: She is alert and oriented to person, place, and time.   Skin:         Comments: Left chest wall 1cm by 0.3 cm open wound covered with delicate new epithelial layer no surrounding erythema or induration. Applied mupirocin, covered with bandaid   Nursing note and vitals reviewed.      Assessment:     1. Abscess        Plan:       Abscess  Comments:  s/p I&D. healing. cont current home wound care. keep surgery follow up visit tomorrow  Orders:  -     mupirocin 2 % ointment    Discussed with the patient/parent the diagnosis, treatment plan, home care instructions.  All " questions and concerns addressed.  Patient/guardian understands and is agreeable with the plan.

## 2025-05-07 ENCOUNTER — OFFICE VISIT (OUTPATIENT)
Dept: SURGERY | Facility: CLINIC | Age: 65
End: 2025-05-07
Payer: MEDICARE

## 2025-05-07 VITALS
WEIGHT: 181.19 LBS | SYSTOLIC BLOOD PRESSURE: 127 MMHG | BODY MASS INDEX: 30.93 KG/M2 | HEIGHT: 64 IN | HEART RATE: 72 BPM | DIASTOLIC BLOOD PRESSURE: 81 MMHG

## 2025-05-07 DIAGNOSIS — L02.213 ABSCESS OF CHEST WALL: Primary | ICD-10-CM

## 2025-05-07 PROCEDURE — 99024 POSTOP FOLLOW-UP VISIT: CPT | Mod: POP,,,

## 2025-05-07 PROCEDURE — 99213 OFFICE O/P EST LOW 20 MIN: CPT | Mod: PBBFAC

## 2025-05-07 PROCEDURE — 99999 PR PBB SHADOW E&M-EST. PATIENT-LVL III: CPT | Mod: PBBFAC,,,

## 2025-05-07 NOTE — PROGRESS NOTES
"History & Physical    Subjective     History of Present Illness:  Patient is a 64 y.o. female who presents for wound check of left chest wall wound status post incision and drainage in clinic.  She is concerned about some green/white drainage from the area.  She denies any fevers and chills.  She has been performing daily local wound care.    Chief Complaint   Patient presents with    Wound Check       Review of patient's allergies indicates:   Allergen Reactions    Clindamycin Rash    Atorvastatin      Muscle weakness    Rosuvastatin      Vague "didn't like how I felt while taking"       Current Medications[1]    Past Medical History:   Diagnosis Date    Benign positional vertigo 12/2020     Past Surgical History:   Procedure Laterality Date    APPENDECTOMY       Family History   Problem Relation Name Age of Onset    Breast cancer Sister      Breast cancer Paternal Aunt      Cervical cancer Neg Hx      Endometrial cancer Neg Hx      Vaginal cancer Neg Hx      Ovarian cancer Neg Hx       Social History[2]     Review of Systems:  Review of Systems   Constitutional:  Negative for chills and fever.   Respiratory:  Negative for shortness of breath.    Cardiovascular:  Negative for chest pain.   Genitourinary:  Negative for difficulty urinating.   Skin:  Positive for wound. Negative for color change, pallor and rash.          Objective     Vital Signs (Most Recent)  Pulse: 72 (05/07/25 1324)  BP: 127/81 (05/07/25 1324)  5' 4" (1.626 m)  82.2 kg (181 lb 3.5 oz)     Physical Exam:  Physical Exam  Vitals reviewed.   Constitutional:       General: She is not in acute distress.     Appearance: She is well-developed. She is not toxic-appearing.   HENT:      Head: Normocephalic and atraumatic.      Right Ear: External ear normal.      Left Ear: External ear normal.      Nose: Nose normal.      Mouth/Throat:      Mouth: Mucous membranes are moist.   Eyes:      Extraocular Movements: Extraocular movements intact.      " Conjunctiva/sclera: Conjunctivae normal.   Cardiovascular:      Rate and Rhythm: Normal rate.   Pulmonary:      Effort: Pulmonary effort is normal. No respiratory distress.   Chest:       Musculoskeletal:      Cervical back: Neck supple.   Skin:     General: Skin is warm and dry.   Neurological:      Mental Status: She is alert and oriented to person, place, and time.   Psychiatric:         Behavior: Behavior normal.            Assessment and Plan   64 year old female status post incision and drainage of a left chest wall abscess who is recovering as expected.      -continue daily local wound care as needed.  Do not need to use topical creams or ointments in the area.  Should completely heal in the next few weeks.  -discussed that these areas have very low risk of infection, and the wound does not look infected today.  -return to clinic 2-3 weeks for wound check or sooner if needed      Roro Interiano PA-C  [facility]           [1]   Current Outpatient Medications   Medication Sig Dispense Refill    ascorbic acid, vitamin C, (VITAMIN C) 500 MG tablet Take 500 mg by mouth once daily.      ibuprofen (ADVIL,MOTRIN) 800 MG tablet Take 800 mg by mouth every 6 (six) hours as needed for Pain.      multivitamin (THERAGRAN) per tablet Take 1 tablet by mouth once daily.       No current facility-administered medications for this visit.   [2]   Social History  Tobacco Use    Smoking status: Never     Passive exposure: Past    Smokeless tobacco: Never   Substance Use Topics    Alcohol use: No    Drug use: No

## 2025-05-19 ENCOUNTER — OFFICE VISIT (OUTPATIENT)
Dept: SURGERY | Facility: CLINIC | Age: 65
End: 2025-05-19
Payer: MEDICARE

## 2025-05-19 VITALS
WEIGHT: 182 LBS | SYSTOLIC BLOOD PRESSURE: 111 MMHG | DIASTOLIC BLOOD PRESSURE: 66 MMHG | HEART RATE: 72 BPM | BODY MASS INDEX: 31.24 KG/M2

## 2025-05-19 DIAGNOSIS — L02.213 ABSCESS OF CHEST WALL: Primary | ICD-10-CM

## 2025-05-19 PROCEDURE — 99213 OFFICE O/P EST LOW 20 MIN: CPT | Mod: PBBFAC

## 2025-05-19 PROCEDURE — 99999 PR PBB SHADOW E&M-EST. PATIENT-LVL III: CPT | Mod: PBBFAC,,,

## 2025-05-19 PROCEDURE — 99024 POSTOP FOLLOW-UP VISIT: CPT | Mod: POP,,,

## 2025-05-19 NOTE — PROGRESS NOTES
"History & Physical    Subjective     History of Present Illness:  Patient is a 64 y.o. female who presents for wound check of left chest wall wound status post incision and drainage in clinic.  She is concerned about some green/white drainage from the area.  She denies any fevers and chills.  She has been performing daily local wound care.    Interval History:  Wound has healed. No further pain or drainage. Denies fevers and chills.     Chief Complaint   Patient presents with    Wound Check     Left chest wall I&D       Review of patient's allergies indicates:   Allergen Reactions    Clindamycin Rash    Atorvastatin      Muscle weakness    Rosuvastatin      Vague "didn't like how I felt while taking"       Current Medications[1]    Past Medical History:   Diagnosis Date    Benign positional vertigo 12/2020     Past Surgical History:   Procedure Laterality Date    APPENDECTOMY       Family History   Problem Relation Name Age of Onset    Breast cancer Sister      Breast cancer Paternal Aunt      Cervical cancer Neg Hx      Endometrial cancer Neg Hx      Vaginal cancer Neg Hx      Ovarian cancer Neg Hx       Social History[2]     Review of Systems:  Review of Systems   Constitutional:  Negative for chills and fever.   Respiratory:  Negative for shortness of breath.    Cardiovascular:  Negative for chest pain.   Genitourinary:  Negative for difficulty urinating.   Skin:  Negative for color change, pallor, rash and wound.          Objective     Vital Signs (Most Recent)  Pulse: 72 (05/19/25 1059)  BP: 111/66 (05/19/25 1059)     82.6 kg (182 lb)     Physical Exam:  Physical Exam  Vitals reviewed.   Constitutional:       General: She is not in acute distress.     Appearance: She is well-developed. She is not toxic-appearing.   HENT:      Head: Normocephalic and atraumatic.      Right Ear: External ear normal.      Left Ear: External ear normal.      Nose: Nose normal.      Mouth/Throat:      Mouth: Mucous membranes are " moist.   Eyes:      Extraocular Movements: Extraocular movements intact.      Conjunctiva/sclera: Conjunctivae normal.   Cardiovascular:      Rate and Rhythm: Normal rate.   Pulmonary:      Effort: Pulmonary effort is normal. No respiratory distress.   Chest:       Musculoskeletal:      Cervical back: Neck supple.   Skin:     General: Skin is warm and dry.   Neurological:      Mental Status: She is alert and oriented to person, place, and time.   Psychiatric:         Behavior: Behavior normal.            Assessment and Plan   64 year old female status post incision and drainage of a left chest wall abscess who is recovering as expected.      - No further interventions or restrictions  -RTC as needed or if any issues arise    Roro Interiano PA-C  [facility]             [1]   Current Outpatient Medications   Medication Sig Dispense Refill    ascorbic acid, vitamin C, (VITAMIN C) 500 MG tablet Take 500 mg by mouth once daily.      ibuprofen (ADVIL,MOTRIN) 800 MG tablet Take 800 mg by mouth every 6 (six) hours as needed for Pain.      multivitamin (THERAGRAN) per tablet Take 1 tablet by mouth once daily.       No current facility-administered medications for this visit.   [2]   Social History  Tobacco Use    Smoking status: Never     Passive exposure: Past    Smokeless tobacco: Never   Substance Use Topics    Alcohol use: No    Drug use: No

## 2025-08-04 ENCOUNTER — TELEPHONE (OUTPATIENT)
Dept: OPHTHALMOLOGY | Facility: CLINIC | Age: 65
End: 2025-08-04
Payer: MEDICARE